# Patient Record
Sex: FEMALE | Race: OTHER | HISPANIC OR LATINO | URBAN - METROPOLITAN AREA
[De-identification: names, ages, dates, MRNs, and addresses within clinical notes are randomized per-mention and may not be internally consistent; named-entity substitution may affect disease eponyms.]

---

## 2017-07-17 ENCOUNTER — INPATIENT (INPATIENT)
Facility: HOSPITAL | Age: 52
LOS: 3 days | Discharge: HOME | DRG: 287 | End: 2017-07-21
Attending: INTERNAL MEDICINE | Admitting: INTERNAL MEDICINE
Payer: COMMERCIAL

## 2017-07-17 VITALS
SYSTOLIC BLOOD PRESSURE: 103 MMHG | RESPIRATION RATE: 24 BRPM | OXYGEN SATURATION: 97 % | HEART RATE: 97 BPM | DIASTOLIC BLOOD PRESSURE: 64 MMHG

## 2017-07-17 DIAGNOSIS — K46.9 UNSPECIFIED ABDOMINAL HERNIA WITHOUT OBSTRUCTION OR GANGRENE: Chronic | ICD-10-CM

## 2017-07-17 DIAGNOSIS — Z98.89 OTHER SPECIFIED POSTPROCEDURAL STATES: Chronic | ICD-10-CM

## 2017-07-17 LAB
ALBUMIN SERPL ELPH-MCNC: 4.4 G/DL — SIGNIFICANT CHANGE UP (ref 3.3–5)
ALP SERPL-CCNC: 63 U/L — SIGNIFICANT CHANGE UP (ref 40–120)
ALT FLD-CCNC: 20 U/L RC — SIGNIFICANT CHANGE UP (ref 10–45)
ANION GAP SERPL CALC-SCNC: 20 MMOL/L — HIGH (ref 5–17)
APTT BLD: 27.4 SEC — LOW (ref 27.5–37.4)
AST SERPL-CCNC: 18 U/L — SIGNIFICANT CHANGE UP (ref 10–40)
BASOPHILS # BLD AUTO: 0.1 K/UL — SIGNIFICANT CHANGE UP (ref 0–0.2)
BASOPHILS NFR BLD AUTO: 1.4 % — SIGNIFICANT CHANGE UP (ref 0–2)
BILIRUB SERPL-MCNC: 0.3 MG/DL — SIGNIFICANT CHANGE UP (ref 0.2–1.2)
BUN SERPL-MCNC: 23 MG/DL — SIGNIFICANT CHANGE UP (ref 7–23)
CALCIUM SERPL-MCNC: 10 MG/DL — SIGNIFICANT CHANGE UP (ref 8.4–10.5)
CHLORIDE SERPL-SCNC: 99 MMOL/L — SIGNIFICANT CHANGE UP (ref 96–108)
CO2 SERPL-SCNC: 22 MMOL/L — SIGNIFICANT CHANGE UP (ref 22–31)
CREAT SERPL-MCNC: 0.69 MG/DL — SIGNIFICANT CHANGE UP (ref 0.5–1.3)
EOSINOPHIL # BLD AUTO: 0.2 K/UL — SIGNIFICANT CHANGE UP (ref 0–0.5)
EOSINOPHIL NFR BLD AUTO: 3.1 % — SIGNIFICANT CHANGE UP (ref 0–6)
GAS PNL BLDV: SIGNIFICANT CHANGE UP
GLUCOSE SERPL-MCNC: 155 MG/DL — HIGH (ref 70–99)
HCT VFR BLD CALC: 38.1 % — SIGNIFICANT CHANGE UP (ref 34.5–45)
HGB BLD-MCNC: 13 G/DL — SIGNIFICANT CHANGE UP (ref 11.5–15.5)
INR BLD: 1 RATIO — SIGNIFICANT CHANGE UP (ref 0.88–1.16)
LYMPHOCYTES # BLD AUTO: 2 K/UL — SIGNIFICANT CHANGE UP (ref 1–3.3)
LYMPHOCYTES # BLD AUTO: 28.3 % — SIGNIFICANT CHANGE UP (ref 13–44)
MCHC RBC-ENTMCNC: 29.9 PG — SIGNIFICANT CHANGE UP (ref 27–34)
MCHC RBC-ENTMCNC: 34.2 GM/DL — SIGNIFICANT CHANGE UP (ref 32–36)
MCV RBC AUTO: 87.3 FL — SIGNIFICANT CHANGE UP (ref 80–100)
MONOCYTES # BLD AUTO: 0.6 K/UL — SIGNIFICANT CHANGE UP (ref 0–0.9)
MONOCYTES NFR BLD AUTO: 8.6 % — SIGNIFICANT CHANGE UP (ref 2–14)
NEUTROPHILS # BLD AUTO: 4.1 K/UL — SIGNIFICANT CHANGE UP (ref 1.8–7.4)
NEUTROPHILS NFR BLD AUTO: 58.6 % — SIGNIFICANT CHANGE UP (ref 43–77)
NT-PROBNP SERPL-SCNC: 34 PG/ML — SIGNIFICANT CHANGE UP (ref 0–300)
PLATELET # BLD AUTO: 301 K/UL — SIGNIFICANT CHANGE UP (ref 150–400)
POTASSIUM SERPL-MCNC: 4.2 MMOL/L — SIGNIFICANT CHANGE UP (ref 3.5–5.3)
POTASSIUM SERPL-SCNC: 4.2 MMOL/L — SIGNIFICANT CHANGE UP (ref 3.5–5.3)
PROT SERPL-MCNC: 7.8 G/DL — SIGNIFICANT CHANGE UP (ref 6–8.3)
PROTHROM AB SERPL-ACNC: 10.8 SEC — SIGNIFICANT CHANGE UP (ref 9.8–12.7)
RBC # BLD: 4.36 M/UL — SIGNIFICANT CHANGE UP (ref 3.8–5.2)
RBC # FLD: 13.6 % — SIGNIFICANT CHANGE UP (ref 10.3–14.5)
SODIUM SERPL-SCNC: 141 MMOL/L — SIGNIFICANT CHANGE UP (ref 135–145)
TROPONIN T SERPL-MCNC: <0.01 NG/ML — SIGNIFICANT CHANGE UP (ref 0–0.06)
TROPONIN T SERPL-MCNC: <0.01 NG/ML — SIGNIFICANT CHANGE UP (ref 0–0.06)
WBC # BLD: 6.9 K/UL — SIGNIFICANT CHANGE UP (ref 3.8–10.5)
WBC # FLD AUTO: 6.9 K/UL — SIGNIFICANT CHANGE UP (ref 3.8–10.5)

## 2017-07-17 PROCEDURE — 99220: CPT | Mod: 25

## 2017-07-17 PROCEDURE — 93010 ELECTROCARDIOGRAM REPORT: CPT

## 2017-07-17 PROCEDURE — 93010 ELECTROCARDIOGRAM REPORT: CPT | Mod: 77

## 2017-07-17 RX ORDER — SODIUM CHLORIDE 9 MG/ML
3 INJECTION INTRAMUSCULAR; INTRAVENOUS; SUBCUTANEOUS ONCE
Qty: 0 | Refills: 0 | Status: COMPLETED | OUTPATIENT
Start: 2017-07-17 | End: 2017-07-17

## 2017-07-17 RX ORDER — FUROSEMIDE 40 MG
40 TABLET ORAL DAILY
Qty: 0 | Refills: 0 | Status: DISCONTINUED | OUTPATIENT
Start: 2017-07-17 | End: 2017-07-18

## 2017-07-17 RX ORDER — SODIUM CHLORIDE 9 MG/ML
3 INJECTION INTRAMUSCULAR; INTRAVENOUS; SUBCUTANEOUS EVERY 12 HOURS
Qty: 0 | Refills: 0 | Status: DISCONTINUED | OUTPATIENT
Start: 2017-07-17 | End: 2017-07-21

## 2017-07-17 RX ORDER — SODIUM CHLORIDE 9 MG/ML
1000 INJECTION INTRAMUSCULAR; INTRAVENOUS; SUBCUTANEOUS
Qty: 0 | Refills: 0 | Status: DISCONTINUED | OUTPATIENT
Start: 2017-07-17 | End: 2017-07-18

## 2017-07-17 RX ORDER — CARVEDILOL PHOSPHATE 80 MG/1
12.5 CAPSULE, EXTENDED RELEASE ORAL EVERY 12 HOURS
Qty: 0 | Refills: 0 | Status: DISCONTINUED | OUTPATIENT
Start: 2017-07-17 | End: 2017-07-18

## 2017-07-17 RX ORDER — ASPIRIN/CALCIUM CARB/MAGNESIUM 324 MG
324 TABLET ORAL ONCE
Qty: 0 | Refills: 0 | Status: COMPLETED | OUTPATIENT
Start: 2017-07-17 | End: 2017-07-17

## 2017-07-17 RX ORDER — VALSARTAN 80 MG/1
320 TABLET ORAL DAILY
Qty: 0 | Refills: 0 | Status: DISCONTINUED | OUTPATIENT
Start: 2017-07-17 | End: 2017-07-20

## 2017-07-17 RX ORDER — SODIUM CHLORIDE 9 MG/ML
500 INJECTION INTRAMUSCULAR; INTRAVENOUS; SUBCUTANEOUS ONCE
Qty: 0 | Refills: 0 | Status: COMPLETED | OUTPATIENT
Start: 2017-07-17 | End: 2017-07-17

## 2017-07-17 RX ADMIN — SODIUM CHLORIDE 3 MILLILITER(S): 9 INJECTION INTRAMUSCULAR; INTRAVENOUS; SUBCUTANEOUS at 19:51

## 2017-07-17 RX ADMIN — Medication 75 MILLIGRAM(S): at 22:34

## 2017-07-17 RX ADMIN — Medication 324 MILLIGRAM(S): at 15:20

## 2017-07-17 RX ADMIN — SODIUM CHLORIDE 3 MILLILITER(S): 9 INJECTION INTRAMUSCULAR; INTRAVENOUS; SUBCUTANEOUS at 14:52

## 2017-07-17 RX ADMIN — SODIUM CHLORIDE 100 MILLILITER(S): 9 INJECTION INTRAMUSCULAR; INTRAVENOUS; SUBCUTANEOUS at 22:34

## 2017-07-17 RX ADMIN — SODIUM CHLORIDE 500 MILLILITER(S): 9 INJECTION INTRAMUSCULAR; INTRAVENOUS; SUBCUTANEOUS at 21:35

## 2017-07-17 NOTE — ED CDU PROVIDER NOTE - DETAILS
CROW, Chest Pain  -frequent reevaluations  -tele monitoring  -repeat troponin w/ EKG  -Stress test  -Case d/w Dr. Whiting

## 2017-07-17 NOTE — ED PROVIDER NOTE - OBJECTIVE STATEMENT
53yo F pmhx of HF ? EF on ASA, hernia repair, c/s pw with progressive dyspnea on exertion x weeks and 1 episodes of left sided chest pain that lasted minutes. Pain came on at rest. no radiation. did not take meds for pain. No nausea, vomiting, diaphoresis, no focal numbness or weakness. Pt endorses recent medication change 3-4 days ago from spironolactone to diovan. Endorses bl le edema.    No fever/chills, no change in vision, no throat pain, no jvd, +sob, +leg swelling no calf pain, no orthopnea, no pnd, +decrease exersized tolerance, no recent travel, no cancer hx, no prior hx of dvt/blood clot,  no abdominal pain, no nausea/vomiting,  no dysuria, no joint pain, no rashes, no focal numbness or weakness, no known mental health issues

## 2017-07-17 NOTE — ED CDU PROVIDER NOTE - OBJECTIVE STATEMENT
51yo F pmhx of HF ? EF on ASA, hernia repair, c/s pw with progressive dyspnea on exertion x weeks and 1 episodes of left sided chest pain that lasted minutes. Pain came on at rest. no radiation. did not take meds for pain. No nausea, vomiting, diaphoresis, no focal numbness or weakness. Pt endorses recent medication change 3-4 days ago from spironolactone to diovan. Endorses bl le edema.    No fever/chills, no change in vision, no throat pain, no jvd, +sob, +leg swelling no calf pain, no orthopnea, no pnd, +decrease exersized tolerance, no recent travel, no cancer hx, no prior hx of dvt/blood clot,  no abdominal pain, no nausea/vomiting,  no dysuria, no joint pain, no rashes, no focal numbness or weakness, no known mental health issues

## 2017-07-17 NOTE — ED PROVIDER NOTE - MEDICAL DECISION MAKING DETAILS
Att yo female recent dx of chf on lasix day 2 presents with progressive fleming x 1 week; had bilateral le edema which is improving; chest pain x 1 episode none currently; no fevers; no abdominal pain; no cough; on exam nad, lungs cta, nontender abdomen; 1+ bilateral le edema; r/o includes cardiac, chf, heme, metabolic; Plan: ekg, ce, cbc, cmp, bnp, cxr, admission

## 2017-07-17 NOTE — ED CDU PROVIDER NOTE - PROGRESS NOTE DETAILS
Attempted call to patients primary care physician Dr. Larry Robertson, no one available covering at this time. Juan Sotelo PA-C. CDU NOTE GRUPO Agustin: pt resting comfortably, feels well without complaint. NAD VSS. no events on tele. CDU NOTE GRUPO Agustin: pt asleep. NAD VSS. no events on tele. CDU NOTE PA Nikole: pt resting c/o chest pressure and sob. NAD VSS. no events on tele. pt had just lowered head rest. EKG- no ischemic findings. Attending MD Wilson: 52F with PMH including DM, HF, HTN presented with chest pain and was pending stress.  Unable to tolerate stress, claustrophobic for nuclear portion, suboptimal candidate for stress echo.  Given persistent symptoms, will admit. On exam, head NCAT, no tenderness to palpation or stepoffs along length of spine, lungs CTAB with good inspiratory effort, +S1S2, no m/r/g, abdomen soft with +BS, NT,  moving all extremities, ambulating. A/P: 52F with chest pain with multiple medical comorbidities, will admit

## 2017-07-17 NOTE — ED ADULT NURSE NOTE - OBJECTIVE STATEMENT
51 yo F wheeled in from triage co SOB since this morning. Recently diagnosed with CHF on Saturday started on 40 mg Lasix 1x daily. Hx of diabetes, hypertension, 2x CVA's in 2013, hernia repair and small bowel obstruction repair in 2003. Upon assessment 96-97% on room air, Lung sounds clear and diminished bilaterally. Pt states she feels better lying on stretcher opposed to walking. Pt states edema is less severe than it was on Saturday. Patient presents with slight edema upon assessment. Vital signs are stable, EKG performed with large bore IV in place. Abdomen distended per patient this is baseline. Pt sleeps sitting up at night. hx of smoking but quit 4 years ago.

## 2017-07-17 NOTE — ED CDU PROVIDER NOTE - PMH
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

## 2017-07-17 NOTE — ED CDU PROVIDER NOTE - ATTENDING CONTRIBUTION TO CARE
Att yo female recent dx of chf on lasix day 2 presents with progressive fleming x 1 week; had bilateral le edema which is improving; chest pain x 1 episode none currently; no fevers; no abdominal pain; no cough; on exam nad, lungs cta, nontender abdomen; 1+ bilateral le edema; initial troponin negative; Plan: cdu for serial enzymes, stress, tele

## 2017-07-17 NOTE — ED CDU PROVIDER NOTE - MEDICAL DECISION MAKING DETAILS
Att yo female recent dx of chf on lasix day 2 presents with progressive fleming x 1 week; had bilateral le edema which is improving; chest pain x 1 episode none currently; no fevers; no abdominal pain; no cough; on exam nad, lungs cta, nontender abdomen; 1+ bilateral le edema; r/o includes cardiac, chf, heme, metabolic; initial trop negative; plan: tele, repeat trop, stress

## 2017-07-17 NOTE — ED CDU PROVIDER NOTE - PLAN OF CARE
1. Stay hydrated.  2. Continue Current Home Medications  3. Follow up with your PCP in 1-2 days (Bring printed results to your doctor visit).  4. Return if symptoms, worsen, fever, weakness, chest pain, difficulty breathing, dizziness and all other concerns. 1. Stay hydrated.  2. Continue Current Home Medications  3. Follow up with your PCP Dr. Larry Robertson in 1-2 days (Bring printed results to your doctor visit).  4. Return if symptoms, worsen, fever, weakness, chest pain, difficulty breathing, dizziness and all other concerns.

## 2017-07-18 DIAGNOSIS — E11.9 TYPE 2 DIABETES MELLITUS WITHOUT COMPLICATIONS: ICD-10-CM

## 2017-07-18 DIAGNOSIS — I10 ESSENTIAL (PRIMARY) HYPERTENSION: ICD-10-CM

## 2017-07-18 DIAGNOSIS — E11.40 TYPE 2 DIABETES MELLITUS WITH DIABETIC NEUROPATHY, UNSPECIFIED: ICD-10-CM

## 2017-07-18 DIAGNOSIS — R07.9 CHEST PAIN, UNSPECIFIED: ICD-10-CM

## 2017-07-18 DIAGNOSIS — E05.90 THYROTOXICOSIS, UNSPECIFIED WITHOUT THYROTOXIC CRISIS OR STORM: ICD-10-CM

## 2017-07-18 DIAGNOSIS — R06.09 OTHER FORMS OF DYSPNEA: ICD-10-CM

## 2017-07-18 LAB
CHOLEST SERPL-MCNC: 166 MG/DL — SIGNIFICANT CHANGE UP (ref 10–199)
GAS PNL BLDV: SIGNIFICANT CHANGE UP
HBA1C BLD-MCNC: 8.2 % — HIGH (ref 4–5.6)
HDLC SERPL-MCNC: 31 MG/DL — LOW (ref 40–125)
LIPID PNL WITH DIRECT LDL SERPL: SIGNIFICANT CHANGE UP
TOTAL CHOLESTEROL/HDL RATIO MEASUREMENT: 5.4 RATIO — SIGNIFICANT CHANGE UP (ref 3.3–7.1)
TRIGL SERPL-MCNC: 495 MG/DL — HIGH (ref 10–149)

## 2017-07-18 PROCEDURE — 99217: CPT

## 2017-07-18 RX ORDER — ENOXAPARIN SODIUM 100 MG/ML
40 INJECTION SUBCUTANEOUS DAILY
Qty: 0 | Refills: 0 | Status: DISCONTINUED | OUTPATIENT
Start: 2017-07-18 | End: 2017-07-21

## 2017-07-18 RX ORDER — INSULIN LISPRO 100/ML
7 VIAL (ML) SUBCUTANEOUS
Qty: 0 | Refills: 0 | Status: DISCONTINUED | OUTPATIENT
Start: 2017-07-18 | End: 2017-07-21

## 2017-07-18 RX ORDER — INSULIN GLARGINE 100 [IU]/ML
12 INJECTION, SOLUTION SUBCUTANEOUS AT BEDTIME
Qty: 0 | Refills: 0 | Status: DISCONTINUED | OUTPATIENT
Start: 2017-07-18 | End: 2017-07-21

## 2017-07-18 RX ORDER — DEXTROSE 50 % IN WATER 50 %
1 SYRINGE (ML) INTRAVENOUS ONCE
Qty: 0 | Refills: 0 | Status: DISCONTINUED | OUTPATIENT
Start: 2017-07-18 | End: 2017-07-21

## 2017-07-18 RX ORDER — ASPIRIN/CALCIUM CARB/MAGNESIUM 324 MG
81 TABLET ORAL DAILY
Qty: 0 | Refills: 0 | Status: DISCONTINUED | OUTPATIENT
Start: 2017-07-18 | End: 2017-07-21

## 2017-07-18 RX ORDER — CALCIUM CARBONATE 500(1250)
1 TABLET ORAL ONCE
Qty: 0 | Refills: 0 | Status: COMPLETED | OUTPATIENT
Start: 2017-07-18 | End: 2017-07-18

## 2017-07-18 RX ORDER — INSULIN LISPRO 100/ML
VIAL (ML) SUBCUTANEOUS AT BEDTIME
Qty: 0 | Refills: 0 | Status: DISCONTINUED | OUTPATIENT
Start: 2017-07-18 | End: 2017-07-21

## 2017-07-18 RX ORDER — SODIUM CHLORIDE 9 MG/ML
1000 INJECTION, SOLUTION INTRAVENOUS
Qty: 0 | Refills: 0 | Status: DISCONTINUED | OUTPATIENT
Start: 2017-07-18 | End: 2017-07-21

## 2017-07-18 RX ORDER — PANTOPRAZOLE SODIUM 20 MG/1
40 TABLET, DELAYED RELEASE ORAL
Qty: 0 | Refills: 0 | Status: DISCONTINUED | OUTPATIENT
Start: 2017-07-18 | End: 2017-07-21

## 2017-07-18 RX ORDER — ASPIRIN/CALCIUM CARB/MAGNESIUM 324 MG
81 TABLET ORAL DAILY
Qty: 0 | Refills: 0 | Status: DISCONTINUED | OUTPATIENT
Start: 2017-07-18 | End: 2017-07-18

## 2017-07-18 RX ORDER — DOCUSATE SODIUM 100 MG
100 CAPSULE ORAL DAILY
Qty: 0 | Refills: 0 | Status: DISCONTINUED | OUTPATIENT
Start: 2017-07-18 | End: 2017-07-21

## 2017-07-18 RX ORDER — CARVEDILOL PHOSPHATE 80 MG/1
12.5 CAPSULE, EXTENDED RELEASE ORAL EVERY 12 HOURS
Qty: 0 | Refills: 0 | Status: DISCONTINUED | OUTPATIENT
Start: 2017-07-18 | End: 2017-07-21

## 2017-07-18 RX ORDER — INSULIN LISPRO 100/ML
VIAL (ML) SUBCUTANEOUS
Qty: 0 | Refills: 0 | Status: DISCONTINUED | OUTPATIENT
Start: 2017-07-18 | End: 2017-07-21

## 2017-07-18 RX ORDER — ESCITALOPRAM OXALATE 10 MG/1
20 TABLET, FILM COATED ORAL DAILY
Qty: 0 | Refills: 0 | Status: DISCONTINUED | OUTPATIENT
Start: 2017-07-18 | End: 2017-07-21

## 2017-07-18 RX ORDER — DEXTROSE 50 % IN WATER 50 %
25 SYRINGE (ML) INTRAVENOUS ONCE
Qty: 0 | Refills: 0 | Status: DISCONTINUED | OUTPATIENT
Start: 2017-07-18 | End: 2017-07-21

## 2017-07-18 RX ORDER — GLUCAGON INJECTION, SOLUTION 0.5 MG/.1ML
1 INJECTION, SOLUTION SUBCUTANEOUS ONCE
Qty: 0 | Refills: 0 | Status: DISCONTINUED | OUTPATIENT
Start: 2017-07-18 | End: 2017-07-21

## 2017-07-18 RX ORDER — FUROSEMIDE 40 MG
40 TABLET ORAL DAILY
Qty: 0 | Refills: 0 | Status: DISCONTINUED | OUTPATIENT
Start: 2017-07-18 | End: 2017-07-20

## 2017-07-18 RX ORDER — DEXTROSE 50 % IN WATER 50 %
12.5 SYRINGE (ML) INTRAVENOUS ONCE
Qty: 0 | Refills: 0 | Status: DISCONTINUED | OUTPATIENT
Start: 2017-07-18 | End: 2017-07-21

## 2017-07-18 RX ORDER — ATORVASTATIN CALCIUM 80 MG/1
40 TABLET, FILM COATED ORAL AT BEDTIME
Qty: 0 | Refills: 0 | Status: DISCONTINUED | OUTPATIENT
Start: 2017-07-18 | End: 2017-07-21

## 2017-07-18 RX ADMIN — SODIUM CHLORIDE 3 MILLILITER(S): 9 INJECTION INTRAMUSCULAR; INTRAVENOUS; SUBCUTANEOUS at 05:59

## 2017-07-18 RX ADMIN — INSULIN GLARGINE 12 UNIT(S): 100 INJECTION, SOLUTION SUBCUTANEOUS at 22:52

## 2017-07-18 RX ADMIN — ENOXAPARIN SODIUM 40 MILLIGRAM(S): 100 INJECTION SUBCUTANEOUS at 17:33

## 2017-07-18 RX ADMIN — Medication 81 MILLIGRAM(S): at 17:33

## 2017-07-18 RX ADMIN — Medication 100 MILLIGRAM(S): at 17:33

## 2017-07-18 RX ADMIN — Medication 2: at 21:11

## 2017-07-18 RX ADMIN — SODIUM CHLORIDE 3 MILLILITER(S): 9 INJECTION INTRAMUSCULAR; INTRAVENOUS; SUBCUTANEOUS at 17:22

## 2017-07-18 RX ADMIN — Medication 40 MILLIGRAM(S): at 17:33

## 2017-07-18 RX ADMIN — Medication 1 TABLET(S): at 22:52

## 2017-07-18 RX ADMIN — PANTOPRAZOLE SODIUM 40 MILLIGRAM(S): 20 TABLET, DELAYED RELEASE ORAL at 17:33

## 2017-07-18 RX ADMIN — CARVEDILOL PHOSPHATE 12.5 MILLIGRAM(S): 80 CAPSULE, EXTENDED RELEASE ORAL at 17:33

## 2017-07-18 RX ADMIN — ESCITALOPRAM OXALATE 20 MILLIGRAM(S): 10 TABLET, FILM COATED ORAL at 18:59

## 2017-07-18 RX ADMIN — ATORVASTATIN CALCIUM 40 MILLIGRAM(S): 80 TABLET, FILM COATED ORAL at 21:11

## 2017-07-18 RX ADMIN — VALSARTAN 320 MILLIGRAM(S): 80 TABLET ORAL at 11:58

## 2017-07-18 RX ADMIN — Medication 2: at 17:32

## 2017-07-18 RX ADMIN — Medication 75 MILLIGRAM(S): at 13:15

## 2017-07-18 RX ADMIN — Medication 150 MILLIGRAM(S): at 21:11

## 2017-07-18 NOTE — H&P ADULT - ASSESSMENT
53yo F pmhx of DM,HTN ,HLD ,Hyperthyroidism  presented  with progressive dyspnea on exertion x 2 weeks and 1 episodes of left sided chest pain that lasted minutes. Pain came on at rest. no radiation. did not take meds for pain. No nausea, vomiting, diaphoresis, no focal numbness or weakness. Pt endorses recent medication change 3-4 days ago from spironolactone to diovan. Endorses bl le edema. She went to do stress test today but couldnt tolerate

## 2017-07-18 NOTE — H&P ADULT - RS GEN PE MLT RESP DETAILS PC
normal/no intercostal retractions/breath sounds equal/no rhonchi/airway patent/no chest wall tenderness/clear to auscultation bilaterally/respirations non-labored/good air movement/no rales

## 2017-07-18 NOTE — CONSULT NOTE ADULT - ASSESSMENT
Assessment  DMT2: 52y Female with DM T2 with hyperglycemia with neuropathy nephropathy blood sugars running high, non compliant with low carb diet.  HTN: uncontrolled on meds.  HLD:  on statin, tolerating.  Hyperthyroidism: on Tapazole 5mg po rachel, asymptomatic

## 2017-07-18 NOTE — CONSULT NOTE ADULT - SUBJECTIVE AND OBJECTIVE BOX
HPI:  53yo F pmhx of DM,HTN ,HLD ,Hyperthyroidism  presented  with progressive dyspnea on exertion x 2 weeks and 1 episodes of left sided chest pain that lasted minutes. Pain came on at rest. no radiation. did not take meds for pain. No nausea, vomiting, diaphoresis, no focal numbness or weakness. Pt endorses recent medication change 3-4 days ago from spironolactone to diovan. Endorses bl le edema. She went to do stress test today but couldnt tolerate (2017 16:20)    Patient has history of diabetes, on oral meds and on insulin at home, no recent hypoglycemic episodes, no polyuria polydipsia. Patient follows up with PCP for diabetes management. Patient has history of hyperthyroidism on Tapazole, no palpitations, no chest pain.    PAST MEDICAL & SURGICAL HISTORY:  Hyperthyroidism  Neuropathy due to type 2 diabetes mellitus  Stroke: &quot;visual agnosia, b/L cerebellar&quot; 2013  Hypertension  Diabetes  H/O: :   H/O fasciotomy: IV infiltrated on L hand - Median and ulnar nerve damage  Abdominal hernia: ventral hernias, multiple, with mesh, lysis of adhesions in       FAMILY HISTORY:  No pertinent family history in first degree relatives      Social History:    Outpatient Medications:    MEDICATIONS  (STANDING):  sodium chloride 0.9% lock flush 3 milliLiter(s) IV Push every 12 hours  methimazole 5 milliGRAM(s) Oral daily  valsartan 320 milliGRAM(s) Oral daily  insulin lispro (HumaLOG) corrective regimen sliding scale   SubCutaneous three times a day before meals  insulin lispro (HumaLOG) corrective regimen sliding scale   SubCutaneous at bedtime  dextrose 5%. 1000 milliLiter(s) (50 mL/Hr) IV Continuous <Continuous>  dextrose 50% Injectable 12.5 Gram(s) IV Push once  dextrose 50% Injectable 25 Gram(s) IV Push once  dextrose 50% Injectable 25 Gram(s) IV Push once  enoxaparin Injectable 40 milliGRAM(s) SubCutaneous daily  aspirin enteric coated 81 milliGRAM(s) Oral daily  atorvastatin 40 milliGRAM(s) Oral at bedtime  pregabalin 150 milliGRAM(s) Oral two times a day  escitalopram 20 milliGRAM(s) Oral daily  carvedilol 12.5 milliGRAM(s) Oral every 12 hours  furosemide    Tablet 40 milliGRAM(s) Oral daily  docusate sodium 100 milliGRAM(s) Oral daily  pantoprazole    Tablet 40 milliGRAM(s) Oral before breakfast    MEDICATIONS  (PRN):  dextrose Gel 1 Dose(s) Oral once PRN Blood Glucose LESS THAN 70 milliGRAM(s)/deciliter  glucagon  Injectable 1 milliGRAM(s) IntraMuscular once PRN Glucose LESS THAN 70 milligrams/deciliter      Allergies    Plavix (Other)    Intolerances      Review of Systems:  Constitutional: No fever, no chills  Eyes: No blurry vision  Neuro: No tremors  HEENT: No pain, no neck swelling  Cardiovascular: No chest pain, no palpitations  Respiratory: Has SOB, no cough  GI: No nausea, vomiting, abdominal pain  : No dysuria  Skin: no rash  MSK: Has leg swelling, no foot ulcers.  Psych: no depression  Endocrine: no polyuria, polydipsia    ALL OTHER SYSTEMS REVIEWED AND NEGATIVE    UNABLE TO OBTAIN    PHYSICAL EXAM:  VITALS: T(C): 37.2 (17 @ 20:49)  T(F): 99 (17 @ 20:49), Max: 99.5 (17 @ 14:22)  HR: 102 (17 @ 20:49) (85 - 102)  BP: 113/73 (17 @ 20:49) (95/57 - 132/69)  RR:  (17 - 20)  SpO2:  (95% - 98%)  Wt(kg): --  GENERAL: NAD, well-groomed, well-developed  EYES: No proptosis, no lid lag  HEENT:  Atraumatic, Normocephalic  THYROID: Normal size, no palpable nodules  RESPIRATORY: Clear to auscultation bilaterally; No rales, rhonchi, wheezing  CARDIOVASCULAR: Si S2, No murmurs;  GI: Soft, non distended, normal bowel sounds  SKIN: Dry, intact, No rashes or lesions  MUSCULOSKELETAL: Has BL lower extremity edema.  NEURO:  no tremor, sensation decreased in feet BL,    CAPILLARY BLOOD GLUCOSE  312 ( @ 20:49)  191 ( @ 16:36)                            13.0   6.9   )-----------( 301      ( 2017 14:55 )             38.1           141  |  99  |  23  ----------------------------<  155<H>  4.2   |  22  |  0.69    EGFR if : 114  EGFR if non : 99    Ca    10.0          TPro  7.8  /  Alb  4.4  /  TBili  0.3  /  DBili  x   /  AST  18  /  ALT  20  /  AlkPhos  63        Thyroid Function Tests:      Hemoglobin A1C, Whole Blood: 8.2 % <H> [4.0 - 5.6] (17 @ 07:38)       Chol 166 LDL -- HDL 31<L> Trig 495<H>    Radiology:

## 2017-07-18 NOTE — CONSULT NOTE ADULT - SUBJECTIVE AND OBJECTIVE BOX
HISTORY OF PRESENT ILLNESS: HPI:    51 yo F DM,HTN, Chol, hyperthyroidism, CVA, Multiple sclerosis admitted with 2 weeksd of SOB, CROW and atypical CP.  The pt reports dull sub sternal CP, no clear alleviating or aggravating factors, (+) SOB, CROW.  No LOC, NO PND NO orthopnea mild lower extremity edema.  She reports she had a plain treadmill stress test and echo approximately 2 years ago with Dr. Larry Kapoor that was within normal limits.  Denies Known CAD.  The pain is not pleuritic and she denies recent long tips or prolonged periods of immobility.    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 1000 milliLiter(s) (100 mL/Hr) IV Continuous <Continuous>  pregabalin 75 milliGRAM(s) Oral daily  insulin lispro (HumaLOG) corrective regimen sliding scale   SubCutaneous three times a day before meals  insulin lispro (HumaLOG) corrective regimen sliding scale   SubCutaneous at bedtime  dextrose 5%. 1000 milliLiter(s) (50 mL/Hr) IV Continuous <Continuous>  dextrose 50% Injectable 12.5 Gram(s) IV Push once  dextrose 50% Injectable 25 Gram(s) IV Push once  dextrose 50% Injectable 25 Gram(s) IV Push once  enoxaparin Injectable 40 milliGRAM(s) SubCutaneous daily      Allergies    Plavix (Other)    Intolerances        FAMILY HISTORY:  No pertinent family history in first degree relatives    Non-contributary for premature coronary disease or sudden cardiac death    SOCIAL HISTORY:    [X] Non-smoker  [ ] Smoker  [ ] Alcohol      REVIEW OF SYSTEMS:  [X ]chest pain  [ X ]shortness of breath  [  ]palpitations  [  ]syncope  [ ]near syncope [ ]upper extremity weakness   [ ] lower extremity weakness  [  ]diplopia  [  ]altered mental status   [  ]fevers  [ ]chills [ ]nausea  [ ]vomitting  [  ]dysphagia    [ ]abdominal pain  [ ]melena  [ ]BRBPR    [  ]epistaxis  [  ]rash    [X ]lower extremity edema        [x ] All others negative	  [ ] Unable to obtain    PHYSICAL EXAM:  T(C): 37.5 (17 @ 14:22), Max: 37.5 (17 @ 14:22)  HR: 95 (17 @ 14:22) (85 - 96)  BP: 104/69 (17 @ 14:22) (88/52 - 132/69)  RR: 17 (17 @ 14:22) (17 - 22)  SpO2: 98% (17 @ 14:22) (95% - 99%)  Wt(kg): --  I&O's Summary        HEENT:   Normal oral mucosa, PERRL, EOMI	  Lymphatic: No obvious lymphadenopathy , (+) trace edema  Cardiovascular: Normal S1 S2, No JVD,  1/6 MAGNUS murmur , Peripheral pulses palpable 2+ bilaterally  Respiratory: Lungs clear to auscultation, normal effort 	  Gastrointestinal:  Soft, Non-tender, + BS	  Skin: No rashes, No cyanosis, warm to touch  Musculoskeletal: Normal range of motion, normal strength  Psychiatry:  Appropriate Mood & affect     TELEMETRY: 	  Sinus   ECG:  Sinus 95 BPM, nonspecific ST/T wave abnormality	  RADIOLOGY:       CXR: No infiltrates       	  	  LABS:	 	    CARDIAC MARKERS:  CARDIAC MARKERS ( 2017 21:37 )  x     / <0.01 ng/mL / x     / x     / x      CARDIAC MARKERS ( 2017 14:55 )  x     / <0.01 ng/mL / x     / x     / x                                  13.0   6.9   )-----------( 301      ( 2017 14:55 )             38.1     Hb Trend:         141  |  99  |  23  ----------------------------<  155<H>  4.2   |  22  |  0.69    Ca    10.0      2017 14:55    TPro  7.8  /  Alb  4.4  /  TBili  0.3  /  DBili  x   /  AST  18  /  ALT  20  /  AlkPhos  63      Creatinine Trend: 0.69<--      Lipid Profile:   HgA1c: Hemoglobin A1C, Whole Blood: 8.2 % ( @ 07:38)        ASSESSMENT/PLAN: 	52y Female DM,HTN, Chol, hyperthyroidism, CVA, Multiple sclerosis admitted with 2 weeks of SOB, CROW and atypical CP r/o for MI.    - echo  - given continued episodes of CP and her inability to tolerate a nuclear stress today will plan for cath to definitively exclude obstructive CAD.  - Plan discussed with patient in detail    I once again thank you for allowing me to participate in the care of your patient.  If you have any questions or concerns please do not hesitate to contact me.    Herbert Cunningham MD, FACC  [facility], [facility]   [place] [address]  PHONE:  [phone]  BEEPER : [phone] HISTORY OF PRESENT ILLNESS: HPI:    53 yo F DM,HTN, Chol, hyperthyroidism, CVA, Multiple sclerosis admitted with 2 weeksd of SOB, CROW and atypical CP.  The pt reports dull sub sternal CP, no clear alleviating or aggravating factors, (+) SOB, CROW.  No LOC, NO PND NO orthopnea mild lower extremity edema.  She reports she had a plain treadmill stress test and echo approximately 2 years ago with Dr. Larry Kapoor that was within normal limits.  Denies Known CAD.  The pain is not pleuritic and she denies recent long tips or prolonged periods of immobility.    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 1000 milliLiter(s) (100 mL/Hr) IV Continuous <Continuous>  pregabalin 75 milliGRAM(s) Oral daily  insulin lispro (HumaLOG) corrective regimen sliding scale   SubCutaneous three times a day before meals  insulin lispro (HumaLOG) corrective regimen sliding scale   SubCutaneous at bedtime  dextrose 5%. 1000 milliLiter(s) (50 mL/Hr) IV Continuous <Continuous>  dextrose 50% Injectable 12.5 Gram(s) IV Push once  dextrose 50% Injectable 25 Gram(s) IV Push once  dextrose 50% Injectable 25 Gram(s) IV Push once  enoxaparin Injectable 40 milliGRAM(s) SubCutaneous daily      Allergies    Plavix (Other)    Intolerances        FAMILY HISTORY:  No pertinent family history in first degree relatives    Non-contributary for premature coronary disease or sudden cardiac death    SOCIAL HISTORY:    [X] Non-smoker  [ ] Smoker  [ ] Alcohol      REVIEW OF SYSTEMS:  [X ]chest pain  [ X ]shortness of breath  [  ]palpitations  [  ]syncope  [ ]near syncope [ ]upper extremity weakness   [ ] lower extremity weakness  [  ]diplopia  [  ]altered mental status   [  ]fevers  [ ]chills [ ]nausea  [ ]vomitting  [  ]dysphagia    [ ]abdominal pain  [ ]melena  [ ]BRBPR    [  ]epistaxis  [  ]rash    [X ]lower extremity edema        [x ] All others negative	  [ ] Unable to obtain    PHYSICAL EXAM:  T(C): 37.5 (17 @ 14:22), Max: 37.5 (17 @ 14:22)  HR: 95 (17 @ 14:22) (85 - 96)  BP: 104/69 (17 @ 14:22) (88/52 - 132/69)  RR: 17 (17 @ 14:22) (17 - 22)  SpO2: 98% (17 @ 14:22) (95% - 99%)  Wt(kg): --  I&O's Summary        HEENT:   Normal oral mucosa, PERRL, EOMI	  Lymphatic: No obvious lymphadenopathy , (+) trace edema  Cardiovascular: Normal S1 S2, No JVD,  1/6 MAGNUS murmur , Peripheral pulses palpable 2+ bilaterally  Respiratory: Lungs clear to auscultation, normal effort 	  Gastrointestinal:  Soft, Non-tender, + BS	  Skin: No rashes, No cyanosis, warm to touch  Musculoskeletal: Normal range of motion, normal strength  Psychiatry:  Appropriate Mood & affect     TELEMETRY: 	  Sinus   ECG:  Sinus 95 BPM, nonspecific ST/T wave abnormality	  RADIOLOGY:       CXR: No infiltrates       	  	  LABS:	 	    CARDIAC MARKERS:  CARDIAC MARKERS ( 2017 21:37 )  x     / <0.01 ng/mL / x     / x     / x      CARDIAC MARKERS ( 2017 14:55 )  x     / <0.01 ng/mL / x     / x     / x                                  13.0   6.9   )-----------( 301      ( 2017 14:55 )             38.1     Hb Trend:         141  |  99  |  23  ----------------------------<  155<H>  4.2   |  22  |  0.69    Ca    10.0      2017 14:55    TPro  7.8  /  Alb  4.4  /  TBili  0.3  /  DBili  x   /  AST  18  /  ALT  20  /  AlkPhos  63      Creatinine Trend: 0.69<--      Lipid Profile:   HgA1c: Hemoglobin A1C, Whole Blood: 8.2 % ( @ 07:38)        ASSESSMENT/PLAN: 	52y Female DM,HTN, Chol, hyperthyroidism, CVA, Multiple sclerosis admitted with 2 weeks of SOB, CROW and atypical CP r/o for MI.    - echo  - Start ASA 81 mg PO daily and Lipitor 20 mg PO QHS, would like to start a BB if her BP remanin stable  - given continued episodes of CP and her inability to tolerate a nuclear stress today will plan for cath to definitively exclude obstructive CAD.  - Plan discussed with patient in detail    I once again thank you for allowing me to participate in the care of your patient.  If you have any questions or concerns please do not hesitate to contact me.    Herbert Cunningham MD, [facility], [facility]   [place] [address]  PHONE:  [phone]  BEEPER : [phone] HISTORY OF PRESENT ILLNESS: HPI:    51 yo F DM,HTN, Chol, hyperthyroidism, CVA, Multiple sclerosis admitted with 2 weeksd of SOB, CROW and atypical CP.  The pt reports dull sub sternal CP, no clear alleviating or aggravating factors, (+) SOB, CROW.  No LOC, NO PND NO orthopnea mild lower extremity edema.  She reports she had a plain treadmill stress test and echo approximately 2 years ago with Dr. Larry Kapoor that was within normal limits.  Denies Known CAD.  The pain is not pleuritic and she denies recent long tips or prolonged periods of immobility.    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 1000 milliLiter(s) (100 mL/Hr) IV Continuous <Continuous>  pregabalin 75 milliGRAM(s) Oral daily  insulin lispro (HumaLOG) corrective regimen sliding scale   SubCutaneous three times a day before meals  insulin lispro (HumaLOG) corrective regimen sliding scale   SubCutaneous at bedtime  dextrose 5%. 1000 milliLiter(s) (50 mL/Hr) IV Continuous <Continuous>  dextrose 50% Injectable 12.5 Gram(s) IV Push once  dextrose 50% Injectable 25 Gram(s) IV Push once  dextrose 50% Injectable 25 Gram(s) IV Push once  enoxaparin Injectable 40 milliGRAM(s) SubCutaneous daily      Allergies    Plavix (Other)    Intolerances        FAMILY HISTORY:  No pertinent family history in first degree relatives    Non-contributary for premature coronary disease or sudden cardiac death    SOCIAL HISTORY:    [X] Non-smoker  [ ] Smoker  [ ] Alcohol      REVIEW OF SYSTEMS:  [X ]chest pain  [ X ]shortness of breath  [  ]palpitations  [  ]syncope  [ ]near syncope [ ]upper extremity weakness   [ ] lower extremity weakness  [  ]diplopia  [  ]altered mental status   [  ]fevers  [ ]chills [ ]nausea  [ ]vomitting  [  ]dysphagia    [ ]abdominal pain  [ ]melena  [ ]BRBPR    [  ]epistaxis  [  ]rash    [X ]lower extremity edema        [x ] All others negative	  [ ] Unable to obtain    PHYSICAL EXAM:  T(C): 37.5 (17 @ 14:22), Max: 37.5 (17 @ 14:22)  HR: 95 (17 @ 14:22) (85 - 96)  BP: 104/69 (17 @ 14:22) (88/52 - 132/69)  RR: 17 (17 @ 14:22) (17 - 22)  SpO2: 98% (17 @ 14:22) (95% - 99%)  Wt(kg): --  I&O's Summary        HEENT:   Normal oral mucosa, PERRL, EOMI	  Lymphatic: No obvious lymphadenopathy , (+) trace edema  Cardiovascular: Normal S1 S2, No JVD,  1/6 MAGNUS murmur , Peripheral pulses palpable 2+ bilaterally  Respiratory: Lungs clear to auscultation, normal effort 	  Gastrointestinal:  Soft, Non-tender, + BS	  Skin: No rashes, No cyanosis, warm to touch  Musculoskeletal: Normal range of motion, normal strength  Psychiatry:  Appropriate Mood & affect     TELEMETRY: 	  Sinus   ECG:  Sinus 95 BPM, nonspecific ST/T wave abnormality	  RADIOLOGY:       CXR: No infiltrates       	  	  LABS:	 	    CARDIAC MARKERS:  CARDIAC MARKERS ( 2017 21:37 )  x     / <0.01 ng/mL / x     / x     / x      CARDIAC MARKERS ( 2017 14:55 )  x     / <0.01 ng/mL / x     / x     / x                                  13.0   6.9   )-----------( 301      ( 2017 14:55 )             38.1     Hb Trend:         141  |  99  |  23  ----------------------------<  155<H>  4.2   |  22  |  0.69    Ca    10.0      2017 14:55    TPro  7.8  /  Alb  4.4  /  TBili  0.3  /  DBili  x   /  AST  18  /  ALT  20  /  AlkPhos  63      Creatinine Trend: 0.69<--      Lipid Profile:   HgA1c: Hemoglobin A1C, Whole Blood: 8.2 % ( @ 07:38)        ASSESSMENT/PLAN: 	52y Female DM,HTN, Chol, hyperthyroidism, CVA, Multiple sclerosis admitted with 2 weeks of SOB, CROW and atypical CP r/o for MI.    - echo  - Start ASA 81 mg PO daily and Lipitor 40 mg PO QHS, would like to start a BB if her BP remanin stable  - given continued episodes of CP and her inability to tolerate a nuclear stress today will plan for cath to definitively exclude obstructive CAD.  - Plan discussed with patient in detail    I once again thank you for allowing me to participate in the care of your patient.  If you have any questions or concerns please do not hesitate to contact me.    Herbert Cunningham MD, [facility], [facility]   [place] [address]  PHONE:  [phone]  BEEPER : [phone]

## 2017-07-18 NOTE — H&P ADULT - PROBLEM SELECTOR PLAN 1
R/O ACS ...Cardiology consulted...With multiple risk factors and not able to tolerate Stress test is planned for cardiac cath

## 2017-07-18 NOTE — H&P ADULT - PSH
Abdominal hernia  ventral hernias, multiple, with mesh, lysis of adhesions in   H/O fasciotomy  IV infiltrated on L hand - Median and ulnar nerve damage  H/O:   1989

## 2017-07-18 NOTE — H&P ADULT - HISTORY OF PRESENT ILLNESS
51yo F pmhx of DM,HTN ,HLD ,Hyperthyroidism  presented  with progressive dyspnea on exertion x 2 weeks and 1 episodes of left sided chest pain that lasted minutes. Pain came on at rest. no radiation. did not take meds for pain. No nausea, vomiting, diaphoresis, no focal numbness or weakness. Pt endorses recent medication change 3-4 days ago from spironolactone to diovan. Endorses bl le edema. She went to do stress test today but couldnt tolerate

## 2017-07-18 NOTE — ED ADULT NURSE REASSESSMENT NOTE - NS ED NURSE REASSESS COMMENT FT1
Pt BP 99/56. MD Garza aware, assessed patient. Cleared to go to CDU.
report taken from Bri PALM at 7:00
Pt received from ARPITA Gusman. Pt oriented to CDU & plan of care was discussed. No complaints of chest pain, SOB, dizziness or palpitations. Pt states she hasn't had chest pain in a few hours and only experiencing SOB on exertion. Safety & comfort measures maintained. Call bell in reach. Will continue to monitor.

## 2017-07-19 ENCOUNTER — TRANSCRIPTION ENCOUNTER (OUTPATIENT)
Age: 52
End: 2017-07-19

## 2017-07-19 LAB
ANION GAP SERPL CALC-SCNC: 17 MMOL/L — SIGNIFICANT CHANGE UP (ref 5–17)
BUN SERPL-MCNC: 24 MG/DL — HIGH (ref 7–23)
CALCIUM SERPL-MCNC: 9.7 MG/DL — SIGNIFICANT CHANGE UP (ref 8.4–10.5)
CHLORIDE SERPL-SCNC: 97 MMOL/L — SIGNIFICANT CHANGE UP (ref 96–108)
CO2 SERPL-SCNC: 23 MMOL/L — SIGNIFICANT CHANGE UP (ref 22–31)
CREAT SERPL-MCNC: 0.64 MG/DL — SIGNIFICANT CHANGE UP (ref 0.5–1.3)
GLUCOSE SERPL-MCNC: 184 MG/DL — HIGH (ref 70–99)
HCT VFR BLD CALC: 35 % — SIGNIFICANT CHANGE UP (ref 34.5–45)
HGB BLD-MCNC: 11.3 G/DL — LOW (ref 11.5–15.5)
MCHC RBC-ENTMCNC: 28.3 PG — SIGNIFICANT CHANGE UP (ref 27–34)
MCHC RBC-ENTMCNC: 32.3 GM/DL — SIGNIFICANT CHANGE UP (ref 32–36)
MCV RBC AUTO: 87.7 FL — SIGNIFICANT CHANGE UP (ref 80–100)
PLATELET # BLD AUTO: 289 K/UL — SIGNIFICANT CHANGE UP (ref 150–400)
POTASSIUM SERPL-MCNC: 4.2 MMOL/L — SIGNIFICANT CHANGE UP (ref 3.5–5.3)
POTASSIUM SERPL-SCNC: 4.2 MMOL/L — SIGNIFICANT CHANGE UP (ref 3.5–5.3)
RBC # BLD: 3.99 M/UL — SIGNIFICANT CHANGE UP (ref 3.8–5.2)
RBC # FLD: 14.4 % — SIGNIFICANT CHANGE UP (ref 10.3–14.5)
SODIUM SERPL-SCNC: 137 MMOL/L — SIGNIFICANT CHANGE UP (ref 135–145)
T4 FREE SERPL-MCNC: 1.2 NG/DL — SIGNIFICANT CHANGE UP (ref 0.9–1.8)
THYROPEROXIDASE AB SERPL-ACNC: <10 IU/ML — SIGNIFICANT CHANGE UP (ref 0–34)
TSH SERPL-MCNC: 1.64 UIU/ML — SIGNIFICANT CHANGE UP (ref 0.27–4.2)
WBC # BLD: 5.45 K/UL — SIGNIFICANT CHANGE UP (ref 3.8–10.5)
WBC # FLD AUTO: 5.45 K/UL — SIGNIFICANT CHANGE UP (ref 3.8–10.5)

## 2017-07-19 PROCEDURE — 93306 TTE W/DOPPLER COMPLETE: CPT | Mod: 26

## 2017-07-19 PROCEDURE — 93458 L HRT ARTERY/VENTRICLE ANGIO: CPT | Mod: 26,GC

## 2017-07-19 RX ADMIN — Medication 7 UNIT(S): at 08:33

## 2017-07-19 RX ADMIN — Medication 40 MILLIGRAM(S): at 06:13

## 2017-07-19 RX ADMIN — Medication 7 UNIT(S): at 17:42

## 2017-07-19 RX ADMIN — CARVEDILOL PHOSPHATE 12.5 MILLIGRAM(S): 80 CAPSULE, EXTENDED RELEASE ORAL at 17:39

## 2017-07-19 RX ADMIN — VALSARTAN 320 MILLIGRAM(S): 80 TABLET ORAL at 06:13

## 2017-07-19 RX ADMIN — PANTOPRAZOLE SODIUM 40 MILLIGRAM(S): 20 TABLET, DELAYED RELEASE ORAL at 06:13

## 2017-07-19 RX ADMIN — Medication 4: at 11:57

## 2017-07-19 RX ADMIN — SODIUM CHLORIDE 3 MILLILITER(S): 9 INJECTION INTRAMUSCULAR; INTRAVENOUS; SUBCUTANEOUS at 17:39

## 2017-07-19 RX ADMIN — Medication 81 MILLIGRAM(S): at 11:55

## 2017-07-19 RX ADMIN — Medication 150 MILLIGRAM(S): at 17:42

## 2017-07-19 RX ADMIN — ATORVASTATIN CALCIUM 40 MILLIGRAM(S): 80 TABLET, FILM COATED ORAL at 21:32

## 2017-07-19 RX ADMIN — CARVEDILOL PHOSPHATE 12.5 MILLIGRAM(S): 80 CAPSULE, EXTENDED RELEASE ORAL at 06:13

## 2017-07-19 RX ADMIN — Medication 4: at 08:33

## 2017-07-19 RX ADMIN — ESCITALOPRAM OXALATE 20 MILLIGRAM(S): 10 TABLET, FILM COATED ORAL at 11:55

## 2017-07-19 RX ADMIN — SODIUM CHLORIDE 3 MILLILITER(S): 9 INJECTION INTRAMUSCULAR; INTRAVENOUS; SUBCUTANEOUS at 06:09

## 2017-07-19 RX ADMIN — Medication 150 MILLIGRAM(S): at 06:13

## 2017-07-19 RX ADMIN — Medication 4: at 17:42

## 2017-07-19 RX ADMIN — INSULIN GLARGINE 12 UNIT(S): 100 INJECTION, SOLUTION SUBCUTANEOUS at 21:32

## 2017-07-19 NOTE — PROGRESS NOTE ADULT - SUBJECTIVE AND OBJECTIVE BOX
Chief complaint  Patient is a 52y old  Female who presents with a chief complaint of  Review of systems  Patient in bed, comfortable, no fever,  no hypoglycemia.    Labs and Fingesticks    CAPILLARY BLOOD GLUCOSE  239 (19 Jul 2017 11:48)  205 (19 Jul 2017 08:38)  312 (18 Jul 2017 20:49)  191 (18 Jul 2017 16:36)    Anion Gap, Serum: 17 (07-19 @ 07:40)  Anion Gap, Serum: 20 <H> (07-17 @ 14:55)    Hemoglobin A1C, Whole Blood: 8.2 <H> (07-18 @ 07:38)    Calcium, Total Serum: 9.7 (07-19 @ 07:40)  Calcium, Total Serum: 10.0 (07-17 @ 14:55)  Albumin, Serum: 4.4 (07-17 @ 14:55)    Alanine Aminotransferase (ALT/SGPT): 20 (07-17 @ 14:55)  Alkaline Phosphatase, Serum: 63 (07-17 @ 14:55)  Aspartate Aminotransferase (AST/SGOT): 18 (07-17 @ 14:55)        07-19    137  |  97  |  24<H>  ----------------------------<  184<H>  4.2   |  23  |  0.64    Ca    9.7      19 Jul 2017 07:40    TPro  7.8  /  Alb  4.4  /  TBili  0.3  /  DBili  x   /  AST  18  /  ALT  20  /  AlkPhos  63  07-17                        11.3   5.45  )-----------( 289      ( 19 Jul 2017 07:35 )             35.0     Medications  MEDICATIONS  (STANDING):  sodium chloride 0.9% lock flush 3 milliLiter(s) IV Push every 12 hours  methimazole 5 milliGRAM(s) Oral daily  valsartan 320 milliGRAM(s) Oral daily  insulin lispro (HumaLOG) corrective regimen sliding scale   SubCutaneous three times a day before meals  insulin lispro (HumaLOG) corrective regimen sliding scale   SubCutaneous at bedtime  dextrose 5%. 1000 milliLiter(s) (50 mL/Hr) IV Continuous <Continuous>  dextrose 50% Injectable 12.5 Gram(s) IV Push once  dextrose 50% Injectable 25 Gram(s) IV Push once  dextrose 50% Injectable 25 Gram(s) IV Push once  enoxaparin Injectable 40 milliGRAM(s) SubCutaneous daily  aspirin enteric coated 81 milliGRAM(s) Oral daily  atorvastatin 40 milliGRAM(s) Oral at bedtime  pregabalin 150 milliGRAM(s) Oral two times a day  escitalopram 20 milliGRAM(s) Oral daily  carvedilol 12.5 milliGRAM(s) Oral every 12 hours  furosemide    Tablet 40 milliGRAM(s) Oral daily  docusate sodium 100 milliGRAM(s) Oral daily  pantoprazole    Tablet 40 milliGRAM(s) Oral before breakfast  insulin glargine Injectable (LANTUS) 12 Unit(s) SubCutaneous at bedtime  insulin lispro Injectable (HumaLOG) 7 Unit(s) SubCutaneous three times a day before meals      Physical Exam  General: Patient comfortable in bed  Vital Signs Last 12 Hrs  T(F): 98.4 (07-19-17 @ 11:48), Max: 98.7 (07-19-17 @ 06:12)  HR: 92 (07-19-17 @ 11:48) (90 - 92)  BP: 101/66 (07-19-17 @ 11:48) (101/66 - 124/78)  BP(mean): --  RR: 17 (07-19-17 @ 11:48) (17 - 18)  SpO2: 95% (07-19-17 @ 11:48) (95% - 97%)  Neck: No palpable thyroid nodules.  CVS: S1S2, No murmurs  Respiratory: No wheezing, no crepitations  GI: Abdomen soft, bowel sounds positive  Musculoskeletal: Positive edema lower extremities bilaterally  Skin: No skin rashes, no ecchimosis    Diagnostics    Thyroid Stimulating Hormone, Serum: AM Sched. Collection: 19-Jul-2017 06:00 (07-18 @ 22:26)  Free Thyroxine, Serum: AM Sched. Collection: 19-Jul-2017 06:00 (07-18 @ 22:26)  Thyroperoxidase Antibody: AM Sched. Collection: 19-Jul-2017 06:00 (07-18 @ 22:26)  TSH Receptor Antibody: AM Sched. Collection: 19-Jul-2017 06:00 (07-18 @ 22:26)

## 2017-07-19 NOTE — DISCHARGE NOTE ADULT - CARE PROVIDER_API CALL
Larry Bangura (DO), Family Medicine  [address] [place] [address]  Phone: [phone]  Fax: [phone]    Dipti Calderon  Endocrinolgy  Phone: (   )    -  Fax: (   )    -

## 2017-07-19 NOTE — PROGRESS NOTE ADULT - SUBJECTIVE AND OBJECTIVE BOX
Subjective: pt seen and examined, ROS negative.    sodium chloride 0.9% lock flush 3 milliLiter(s) IV Push every 12 hours  methimazole 5 milliGRAM(s) Oral daily  valsartan 320 milliGRAM(s) Oral daily  insulin lispro (HumaLOG) corrective regimen sliding scale   SubCutaneous three times a day before meals  insulin lispro (HumaLOG) corrective regimen sliding scale   SubCutaneous at bedtime  dextrose 5%. 1000 milliLiter(s) IV Continuous <Continuous>  dextrose Gel 1 Dose(s) Oral once PRN  dextrose 50% Injectable 12.5 Gram(s) IV Push once  dextrose 50% Injectable 25 Gram(s) IV Push once  dextrose 50% Injectable 25 Gram(s) IV Push once  glucagon  Injectable 1 milliGRAM(s) IntraMuscular once PRN  enoxaparin Injectable 40 milliGRAM(s) SubCutaneous daily  aspirin enteric coated 81 milliGRAM(s) Oral daily  atorvastatin 40 milliGRAM(s) Oral at bedtime  pregabalin 150 milliGRAM(s) Oral two times a day  escitalopram 20 milliGRAM(s) Oral daily  carvedilol 12.5 milliGRAM(s) Oral every 12 hours  furosemide    Tablet 40 milliGRAM(s) Oral daily  docusate sodium 100 milliGRAM(s) Oral daily  pantoprazole    Tablet 40 milliGRAM(s) Oral before breakfast  insulin glargine Injectable (LANTUS) 12 Unit(s) SubCutaneous at bedtime  insulin lispro Injectable (HumaLOG) 7 Unit(s) SubCutaneous three times a day before meals                            13.0   6.9   )-----------( 301      ( 17 Jul 2017 14:55 )             38.1       Hemoglobin: 13.0 g/dL (07-17 @ 14:55)      07-17    141  |  99  |  23  ----------------------------<  155<H>  4.2   |  22  |  0.69    Ca    10.0      17 Jul 2017 14:55    TPro  7.8  /  Alb  4.4  /  TBili  0.3  /  DBili  x   /  AST  18  /  ALT  20  /  AlkPhos  63  07-17    Creatinine Trend: 0.69<--    COAGS:     CARDIAC MARKERS ( 17 Jul 2017 21:37 )  x     / <0.01 ng/mL / x     / x     / x      CARDIAC MARKERS ( 17 Jul 2017 14:55 )  x     / <0.01 ng/mL / x     / x     / x            T(C): 37.2 (07-18-17 @ 20:49), Max: 37.5 (07-18-17 @ 14:22)  HR: 102 (07-18-17 @ 20:49) (86 - 102)  BP: 113/73 (07-18-17 @ 20:49) (95/57 - 125/76)  RR: 18 (07-18-17 @ 20:49) (17 - 19)  SpO2: 97% (07-18-17 @ 20:49) (95% - 98%)  Wt(kg): --    I&O's Summary      Daily Height in cm: 152.4 (18 Jul 2017 14:22)    Daily       Appearance: Normal	  HEENT:   Normal oral mucosa, PERRL, EOMI	  Lymphatic: No lymphadenopathy , no edema  Cardiovascular: Normal S1 S2, No JVD, No murmurs , Peripheral pulses palpable 2+ bilaterally  Respiratory: Lungs clear to auscultation, normal effort 	  Gastrointestinal:  Soft, Non-tender, + BS	  Skin: No rashes, No ecchymoses, No cyanosis, warm to touch  Musculoskeletal: Normal range of motion, normal strength  Psychiatry:  Mood & affect appropriate    TELEMETRY:  NSR	    ECG:  	  RADIOLOGY:   DIAGNOSTIC TESTING:  [ ] Echocardiogram:  [ ]  Catheterization:  [ ] Stress Test:    OTHER: 	        ASSESSMENT/PLAN: 	52y Female DM,HTN, Chol, hyperthyroidism, CVA, Multiple sclerosis admitted with 2 weeks of SOB, CROW and atypical CP r/o for MI    cardiac marker neg x 2  ASA, statin , coreg,. ARB,   daily lasix   echo pending  pt unable to tolerate NST , plan for Cardiac cath this week to R/O CAD .  tele stable   GI / DVT prophylaxis.  keep K>4, mag >2.0   Endo follow up   D/W Dr Cunningham Subjective: pt seen and examined, No new compaints    sodium chloride 0.9% lock flush 3 milliLiter(s) IV Push every 12 hours  methimazole 5 milliGRAM(s) Oral daily  valsartan 320 milliGRAM(s) Oral daily  insulin lispro (HumaLOG) corrective regimen sliding scale   SubCutaneous three times a day before meals  insulin lispro (HumaLOG) corrective regimen sliding scale   SubCutaneous at bedtime  dextrose 5%. 1000 milliLiter(s) IV Continuous <Continuous>  dextrose Gel 1 Dose(s) Oral once PRN  dextrose 50% Injectable 12.5 Gram(s) IV Push once  dextrose 50% Injectable 25 Gram(s) IV Push once  dextrose 50% Injectable 25 Gram(s) IV Push once  glucagon  Injectable 1 milliGRAM(s) IntraMuscular once PRN  enoxaparin Injectable 40 milliGRAM(s) SubCutaneous daily  aspirin enteric coated 81 milliGRAM(s) Oral daily  atorvastatin 40 milliGRAM(s) Oral at bedtime  pregabalin 150 milliGRAM(s) Oral two times a day  escitalopram 20 milliGRAM(s) Oral daily  carvedilol 12.5 milliGRAM(s) Oral every 12 hours  furosemide    Tablet 40 milliGRAM(s) Oral daily  docusate sodium 100 milliGRAM(s) Oral daily  pantoprazole    Tablet 40 milliGRAM(s) Oral before breakfast  insulin glargine Injectable (LANTUS) 12 Unit(s) SubCutaneous at bedtime  insulin lispro Injectable (HumaLOG) 7 Unit(s) SubCutaneous three times a day before meals                            13.0   6.9   )-----------( 301      ( 17 Jul 2017 14:55 )             38.1       Hemoglobin: 13.0 g/dL (07-17 @ 14:55)      07-17    141  |  99  |  23  ----------------------------<  155<H>  4.2   |  22  |  0.69    Ca    10.0      17 Jul 2017 14:55    TPro  7.8  /  Alb  4.4  /  TBili  0.3  /  DBili  x   /  AST  18  /  ALT  20  /  AlkPhos  63  07-17    Creatinine Trend: 0.69<--    COAGS:     CARDIAC MARKERS ( 17 Jul 2017 21:37 )  x     / <0.01 ng/mL / x     / x     / x      CARDIAC MARKERS ( 17 Jul 2017 14:55 )  x     / <0.01 ng/mL / x     / x     / x            T(C): 37.2 (07-18-17 @ 20:49), Max: 37.5 (07-18-17 @ 14:22)  HR: 102 (07-18-17 @ 20:49) (86 - 102)  BP: 113/73 (07-18-17 @ 20:49) (95/57 - 125/76)  RR: 18 (07-18-17 @ 20:49) (17 - 19)  SpO2: 97% (07-18-17 @ 20:49) (95% - 98%)  Wt(kg): --    I&O's Summary      Daily Height in cm: 152.4 (18 Jul 2017 14:22)    Daily       Appearance: Normal	  HEENT:   Normal oral mucosa, PERRL, EOMI	  Lymphatic: No lymphadenopathy , no edema  Cardiovascular: Normal S1 S2, No JVD, No murmurs , Peripheral pulses palpable 2+ bilaterally  Respiratory: Lungs clear to auscultation, normal effort 	  Gastrointestinal:  Soft, Non-tender, + BS	  Skin: No rashes, No ecchymoses, No cyanosis, warm to touch  Musculoskeletal: Normal range of motion, normal strength  Psychiatry:  Mood & affect appropriate    TELEMETRY:  NSR	    ECG:  	  RADIOLOGY:   DIAGNOSTIC TESTING:  [ ] Echocardiogram:  [ ]  Catheterization:  [ ] Stress Test:    OTHER: 	        ASSESSMENT/PLAN: 	52y Female DM,HTN, Chol, hyperthyroidism, CVA, Multiple sclerosis admitted with 2 weeks of SOB, CROW and atypical CP r/o for MI    cardiac marker neg x 2  ASA, statin , coreg,. ARB,   daily lasix   echo pending  pt unable to tolerate NST , plan for Cardiac cath this week to R/O CAD .  tele stable   GI / DVT prophylaxis.  keep K>4, mag >2.0   Endo follow up   D/W Dr Cunningham

## 2017-07-19 NOTE — DISCHARGE NOTE ADULT - HOSPITAL COURSE
PMD 53yo F pmhx of DM,HTN ,HLD ,Hyperthyroidism  presented  with progressive dyspnea on exertion x 2 weeks and 1 episodes of left sided chest pain that lasted minutes. Pain came on at rest. no radiation. did not take meds for pain. No nausea, vomiting, diaphoresis, no focal numbness or weakness. Pt endorses recent medication change 3-4 days ago from spironolactone to diovan. Endorses bl le edema. She went to do stress test today but couldnt tolerate .   DXed with Chest pain/SOB of unknown etiology as S/P cardiac cath ,CTA chest and Echocardiogram,Type 2 DM with Hyperglycemia with HTN with Hyperthyroidism with HLD with left Breast/Axillar nodule . Followed by cardiology and Endocrinology. Pt was asymptomatic today so dcd home to follow up with PCP and GYn Mammogram as well follow up.     cardiac cath:   PROCEDURE:  --  Left heart catheterization.  --  Left coronary angiography.  --  Right coronary angiography.  TECHNIQUE: The risks and alternatives of the procedures and conscious  sedation were explained to the patient and informed consent was obtained.  Cardiac catheterization performed electively.  Right radial artery access. Local anesthetic given. A was inserted in the  vesselutilizing the modified Seldinger technique. Local anesthetic given.  Right radial artery access. Left heart catheterization. Left coronary  artery angiography. The vessel was injected utilizing a catheter. Right  coronary artery angiography. The vessel was injected utilizing a catheter.  RADIATION EXPOSURE: 4.1 min.  CONTRAST GIVEN: Omnipaque 32 ml.  HEMOSTASIS: The sheath was removed. The site was compressed with a Hemoband  device. Hemostasis was successful.  MEDICATIONS GIVEN: Midazolam, 1 mg, IV. Fentanyl, 25 mcg, IV. Verapamil  (Isoptin, Calan, Covera), 2.5 mg, IA. Heparin, 3000 units, IA.  CORONARY VESSELS: The coronary circulation is right dominant.  LM:   --  LM: Normal.  LAD:   --  LAD: Normal.  CX:   --  Circumflex: Normal.  RCA:   --  RCA: Normal.  COMPLICATIONS: There were no complications.  DIAGNOSTIC IMPRESSIONS: The coronary anatomy is normal.  DIAGNOSTIC RECOMMENDATIONS: The patient should continue with the present  medications.  Prepared and signed by  Hernando Villanueva M.D.    CTA chest : IMPRESSION: No pulmonary embolism.    1.5 x 0.7 cm left deep left breast/axilla lymph node. Recommend   correlation with mammography and ultrasound.    Echocardiogram: Conclusions:  Technically difficult study.  1. Normal mitral valve. Minimal mitral regurgitation.  2. Aortic valve not well visualized; appears trileaflet  with normal opening. No aortic valve regurgitation seen.  3. Endocardial visualization enhanced with intravenous  injection of echo contrast (Definity). Normal left  ventricular systolic function. No segmental wall motion  abnormalities.  4. The right ventricle is not well visualized; grossly  normal right ventricular systolic function.  *** No previous Echo exam.  -------------------------------------------------------

## 2017-07-19 NOTE — DISCHARGE NOTE ADULT - PROVIDER TOKENS
TOKEN:'[id number]',FREE:[LAST:[Yumiko],FIRST:[Dipti],PHONE:[(   )    -],FAX:[(   )    -],ADDRESS:[Endocrinolgy]]

## 2017-07-19 NOTE — PROGRESS NOTE ADULT - PROBLEM SELECTOR PLAN 1
Will continue current insulin regimen for now. Will continue monitoring FS, log, will Follow up.  Will increase Lantus to 18 units at bed time.  Will increase Humalog to 9 units before each meal in addition to Humalog correction scale coverage.  Patient counseled for compliance with consistent low carb diet.  Will request diabetic teaching since patient will be discharged home on insulin.

## 2017-07-19 NOTE — DISCHARGE NOTE ADULT - MEDICATION SUMMARY - MEDICATIONS TO TAKE
I will START or STAY ON the medications listed below when I get home from the hospital:    aspirin 81 mg oral delayed release tablet  -- 1 tab(s) by mouth once a day  -- Indication: For Cad    valsartan 80 mg oral tablet  -- 1 tab(s) by mouth once a day  -- Indication: For Hypertension    pregabalin 150 mg oral capsule  -- 1 cap(s) by mouth 2 times a day  -- Indication: For Neuropathy due to type 2 diabetes mellitus    escitalopram 20 mg oral tablet  -- 1 tab(s) by mouth once a day  -- Indication: For Depression     Victoza 18 mg/3 mL subcutaneous solution  -- 1.8 milligram(s) subcutaneous once a day  -- Indication: For Diabetes    Jentadueto XR 5 mg-1000 mg oral tablet, extended release  -- 1 tab(s) by mouth once a day  -- Indication: For Diabetes    atorvastatin 40 mg oral tablet  -- 1 tab(s) by mouth once a day (at bedtime)  -- Indication: For Cad    methIMAzole 5 mg oral tablet  -- 1 tab(s) by mouth once a day  -- Indication: For Thyroid     carvedilol 6.25 mg oral tablet  -- 1 tab(s) by mouth every 12 hours  -- Indication: For Heart     Colace 100 mg oral capsule  -- 1 cap(s) by mouth once a day (at bedtime)  -- Indication: For Stool softner     omeprazole 40 mg oral delayed release capsule  -- 1 cap(s) by mouth once a day  -- Indication: For Gerd I will START or STAY ON the medications listed below when I get home from the hospital:    aspirin 81 mg oral delayed release tablet  -- 1 tab(s) by mouth once a day  -- Indication: For Cad    valsartan 80 mg oral tablet  -- 1 tab(s) by mouth once a day  -- Indication: For Hypertension    pregabalin 150 mg oral capsule  -- 1 cap(s) by mouth 2 times a day  -- Indication: For Neuropathy due to type 2 diabetes mellitus    escitalopram 20 mg oral tablet  -- 1 tab(s) by mouth once a day  -- Indication: For Depression     Victoza 18 mg/3 mL subcutaneous solution  -- 1.8 milligram(s) subcutaneous once a day  -- Indication: For Diabetes    Jentadueto XR 5 mg-1000 mg oral tablet, extended release  -- 1 tab(s) by mouth once a day  -- Indication: For Diabetes    atorvastatin 40 mg oral tablet  -- 1 tab(s) by mouth once a day (at bedtime)  -- Indication: For Cad    methIMAzole 5 mg oral tablet  -- 1 tab(s) by mouth once a day  -- Indication: For Thyroid     carvedilol 6.25 mg oral tablet  -- 1 tab(s) by mouth every 12 hours  -- Indication: For Heart     hydroCHLOROthiazide 12.5 mg oral capsule  -- 1 cap(s) by mouth once a day  -- Avoid prolonged or excessive exposure to direct and/or artificial sunlight while taking this medication.  It is very important that you take or use this exactly as directed.  Do not skip doses or discontinue unless directed by your doctor.  It may be advisable to drink a full glass orange juice or eat a banana daily while taking this medication.  Take with food or milk.    -- Indication: For Hypertension    Colace 100 mg oral capsule  -- 1 cap(s) by mouth once a day (at bedtime)  -- Indication: For Stool softner     omeprazole 40 mg oral delayed release capsule  -- 1 cap(s) by mouth once a day  -- Indication: For Gerd

## 2017-07-19 NOTE — DISCHARGE NOTE ADULT - PATIENT PORTAL LINK FT
“You can access the [employer] Patient Portal, offered by [facility], by registering with the following website: [email or web address]”

## 2017-07-19 NOTE — DISCHARGE NOTE ADULT - PLAN OF CARE
Resolved Take meds as directed  HgA1C this admission. 8.2  Make sure you get your HgA1c checked every three months.  If you take oral diabetes medications, check your blood glucose two times a day.  It's important not to skip any meals.  Keep a log of your blood glucose results and always take it with you to your doctor appointments.  Keep a list of your current medications including injectables and over the counter medications and bring this medication list with you to all your doctor appointments.  If you have not seen your ophthalmologist this year call for appointment.  Check your feet daily for redness, sores, or openings. Do not self treat. If no improvement in two days call your primary care physician for an appointment.  Low blood sugar (hypoglycemia) is a blood sugar below 70mg/dl. Check your blood sugar if you feel signs/symptoms of hypoglycemia. If your blood sugar is below 70 take 15 grams of carbohydrates (ex 4 oz of apple juice, 3-4 glucose tablets, or 4-6 oz of regular soda) wait 15 minutes and repeat blood sugar to make sure it comes up above 70.  If your blood sugar is above 70 and you are due for a meal, have a meal.  If you are not due for a meal have a snack.  This snack helps keeps your blood sugar at a safe range. Take meds as directed   diet & exercise HOME CARE INSTRUCTIONS  For the next few days, avoid physical activities that bring on chest pain. Continue physical activities as directed.  Do not smoke.  Avoid drinking alcohol.   Only take over-the-counter or prescription medicine for pain, discomfort, or fever as directed by your caregiver.  Follow your caregiver's suggestions for further testing if your chest pain does not go away.  Keep any follow-up appointments you made. If you do not go to an appointment, you could develop lasting (chronic) problems with pain. If there is any problem keeping an appointment, you must call to reschedule.   SEEK MEDICAL CARE IF:  You think you are having problems from the medicine you are taking. Read your medicine instructions carefully.  Your chest pain does not go away, even after treatment.  You develop a rash with blisters on your chest.  SEEK IMMEDIATE MEDICAL CARE IF:  You have increased chest pain or pain that spreads to your arm, neck, jaw, back, or abdomen.   You develop shortness of breath, an increasing cough, or you are coughing up blood.  You have severe back or abdominal pain, feel nauseous, or vomit.  You develop severe weakness, fainting, or chills.  You have a fever.  THIS IS AN EMERGENCY. Do not wait to see if the pain will go away. Get medical help at once. Call your local emergency services (____[phone]). Do not drive yourself to the hospital.

## 2017-07-19 NOTE — DISCHARGE NOTE ADULT - MEDICATION SUMMARY - MEDICATIONS TO STOP TAKING
I will STOP taking the medications listed below when I get home from the hospital:    fenofibrate 134 mg oral capsule  -- 1 cap(s) by mouth once a day    furosemide 40 mg oral tablet  -- 1 tab(s) by mouth once a day

## 2017-07-19 NOTE — DISCHARGE NOTE ADULT - CARE PLAN
Principal Discharge DX:	Chest pain, unspecified type  Goal:	Resolved  Instructions for follow-up, activity and diet:	HOME CARE INSTRUCTIONS  For the next few days, avoid physical activities that bring on chest pain. Continue physical activities as directed.  Do not smoke.  Avoid drinking alcohol.   Only take over-the-counter or prescription medicine for pain, discomfort, or fever as directed by your caregiver.  Follow your caregiver's suggestions for further testing if your chest pain does not go away.  Keep any follow-up appointments you made. If you do not go to an appointment, you could develop lasting (chronic) problems with pain. If there is any problem keeping an appointment, you must call to reschedule.   SEEK MEDICAL CARE IF:  You think you are having problems from the medicine you are taking. Read your medicine instructions carefully.  Your chest pain does not go away, even after treatment.  You develop a rash with blisters on your chest.  SEEK IMMEDIATE MEDICAL CARE IF:  You have increased chest pain or pain that spreads to your arm, neck, jaw, back, or abdomen.   You develop shortness of breath, an increasing cough, or you are coughing up blood.  You have severe back or abdominal pain, feel nauseous, or vomit.  You develop severe weakness, fainting, or chills.  You have a fever.  THIS IS AN EMERGENCY. Do not wait to see if the pain will go away. Get medical help at once. Call your local emergency services (____[phone]). Do not drive yourself to the hospital.  Secondary Diagnosis:	Diabetes  Instructions for follow-up, activity and diet:	Take meds as directed  HgA1C this admission. 8.2  Make sure you get your HgA1c checked every three months.  If you take oral diabetes medications, check your blood glucose two times a day.  It's important not to skip any meals.  Keep a log of your blood glucose results and always take it with you to your doctor appointments.  Keep a list of your current medications including injectables and over the counter medications and bring this medication list with you to all your doctor appointments.  If you have not seen your ophthalmologist this year call for appointment.  Check your feet daily for redness, sores, or openings. Do not self treat. If no improvement in two days call your primary care physician for an appointment.  Low blood sugar (hypoglycemia) is a blood sugar below 70mg/dl. Check your blood sugar if you feel signs/symptoms of hypoglycemia. If your blood sugar is below 70 take 15 grams of carbohydrates (ex 4 oz of apple juice, 3-4 glucose tablets, or 4-6 oz of regular soda) wait 15 minutes and repeat blood sugar to make sure it comes up above 70.  If your blood sugar is above 70 and you are due for a meal, have a meal.  If you are not due for a meal have a snack.  This snack helps keeps your blood sugar at a safe range.  Secondary Diagnosis:	Hypertension  Instructions for follow-up, activity and diet:	Take meds as directed   diet & exercise

## 2017-07-19 NOTE — PROGRESS NOTE ADULT - SUBJECTIVE AND OBJECTIVE BOX
INTERVAL HPI/OVERNIGHT EVENTS: Feel fine and s/p cath   Vital Signs Last 24 Hrs  T(C): 36.9 (19 Jul 2017 11:48), Max: 37.2 (18 Jul 2017 20:49)  T(F): 98.4 (19 Jul 2017 11:48), Max: 99 (18 Jul 2017 20:49)  HR: 92 (19 Jul 2017 11:48) (90 - 102)  BP: 101/66 (19 Jul 2017 11:48) (101/66 - 124/78)  BP(mean): --  RR: 17 (19 Jul 2017 11:48) (17 - 18)  SpO2: 95% (19 Jul 2017 11:48) (95% - 97%)  I&O's Summary    18 Jul 2017 07:01  -  19 Jul 2017 07:00  --------------------------------------------------------  IN: 100 mL / OUT: 0 mL / NET: 100 mL    19 Jul 2017 07:01  -  19 Jul 2017 16:37  --------------------------------------------------------  IN: 480 mL / OUT: 0 mL / NET: 480 mL      MEDICATIONS  (STANDING):  sodium chloride 0.9% lock flush 3 milliLiter(s) IV Push every 12 hours  methimazole 5 milliGRAM(s) Oral daily  valsartan 320 milliGRAM(s) Oral daily  insulin lispro (HumaLOG) corrective regimen sliding scale   SubCutaneous three times a day before meals  insulin lispro (HumaLOG) corrective regimen sliding scale   SubCutaneous at bedtime  dextrose 5%. 1000 milliLiter(s) (50 mL/Hr) IV Continuous <Continuous>  dextrose 50% Injectable 12.5 Gram(s) IV Push once  dextrose 50% Injectable 25 Gram(s) IV Push once  dextrose 50% Injectable 25 Gram(s) IV Push once  enoxaparin Injectable 40 milliGRAM(s) SubCutaneous daily  aspirin enteric coated 81 milliGRAM(s) Oral daily  atorvastatin 40 milliGRAM(s) Oral at bedtime  pregabalin 150 milliGRAM(s) Oral two times a day  escitalopram 20 milliGRAM(s) Oral daily  carvedilol 12.5 milliGRAM(s) Oral every 12 hours  furosemide    Tablet 40 milliGRAM(s) Oral daily  docusate sodium 100 milliGRAM(s) Oral daily  pantoprazole    Tablet 40 milliGRAM(s) Oral before breakfast  insulin glargine Injectable (LANTUS) 12 Unit(s) SubCutaneous at bedtime  insulin lispro Injectable (HumaLOG) 7 Unit(s) SubCutaneous three times a day before meals    MEDICATIONS  (PRN):  dextrose Gel 1 Dose(s) Oral once PRN Blood Glucose LESS THAN 70 milliGRAM(s)/deciliter  glucagon  Injectable 1 milliGRAM(s) IntraMuscular once PRN Glucose LESS THAN 70 milligrams/deciliter    LABS:                        11.3   5.45  )-----------( 289      ( 19 Jul 2017 07:35 )             35.0     07-19    137  |  97  |  24<H>  ----------------------------<  184<H>  4.2   |  23  |  0.64    Ca    9.7      19 Jul 2017 07:40          CAPILLARY BLOOD GLUCOSE  239 (19 Jul 2017 11:48)  205 (19 Jul 2017 08:38)  312 (18 Jul 2017 20:49)              REVIEW OF SYSTEMS:  CONSTITUTIONAL: No fever, weight loss, or fatigue  EYES: No eye pain, visual disturbances, or discharge  ENMT:  No difficulty hearing, tinnitus, vertigo; No sinus or throat pain  NECK: No pain or stiffness  BREASTS: No pain, masses, or nipple discharge  RESPIRATORY: No cough, wheezing, chills or hemoptysis; No shortness of breath  CARDIOVASCULAR: No chest pain, palpitations, dizziness, or leg swelling  GASTROINTESTINAL: No abdominal or epigastric pain. No nausea, vomiting, or hematemesis; No diarrhea or constipation. No melena or hematochezia.  GENITOURINARY: No dysuria, frequency, hematuria, or incontinence  NEUROLOGICAL: No headaches, memory loss, loss of strength, numbness, or tremors  SKIN: No itching, burning, rashes, or lesions   LYMPH NODES: No enlarged glands  ENDOCRINE: No heat or cold intolerance; No hair loss  MUSCULOSKELETAL: No joint pain or swelling; No muscle, back, or extremity pain  PSYCHIATRIC: No depression, anxiety, mood swings, or difficulty sleeping  HEME/LYMPH: No easy bruising, or bleeding gums  ALLERY AND IMMUNOLOGIC: No hives or eczema    RADIOLOGY & ADDITIONAL TESTS:    Consultant(s) Notes Reviewed:  [x ] YES  [ ] NO    PHYSICAL EXAM:  GENERAL: NAD, well-groomed, well-developed,not in any distress ,  HEAD:  Atraumatic, Normocephalic  EYES: EOMI, PERRLA, conjunctiva and sclera clear  ENMT: No tonsillar erythema, exudates, or enlargement; Moist mucous membranes, Good dentition, No lesions  NECK: Supple, No JVD, Normal thyroid  NERVOUS SYSTEM:  Alert & Oriented X3, No focal deficit   CHEST/LUNG: Good air entry bilateral with no  rales, rhonchi, wheezing, or rubs  HEART: Regular rate and rhythm; No murmurs, rubs, or gallops  ABDOMEN: Soft, Nontender, Nondistended; Bowel sounds present  EXTREMITIES:  2+ Peripheral Pulses, No clubbing, cyanosis, or edema  SKIN: No rashes or lesions    Care Discussed with Consultants/Other Providers [ x] YES  [ ] NO

## 2017-07-20 DIAGNOSIS — R07.9 CHEST PAIN, UNSPECIFIED: ICD-10-CM

## 2017-07-20 DIAGNOSIS — E13.628 OTHER SPECIFIED DIABETES MELLITUS WITH OTHER SKIN COMPLICATIONS: ICD-10-CM

## 2017-07-20 DIAGNOSIS — R06.02 SHORTNESS OF BREATH: ICD-10-CM

## 2017-07-20 LAB — TSH RECEP AB FLD-ACNC: 1.2 IU/L — SIGNIFICANT CHANGE UP (ref 0–1.75)

## 2017-07-20 PROCEDURE — 71275 CT ANGIOGRAPHY CHEST: CPT | Mod: 26

## 2017-07-20 RX ORDER — ALPRAZOLAM 0.25 MG
0.25 TABLET ORAL ONCE
Qty: 0 | Refills: 0 | Status: DISCONTINUED | OUTPATIENT
Start: 2017-07-20 | End: 2017-07-20

## 2017-07-20 RX ORDER — VALSARTAN 80 MG/1
160 TABLET ORAL DAILY
Qty: 0 | Refills: 0 | Status: DISCONTINUED | OUTPATIENT
Start: 2017-07-20 | End: 2017-07-21

## 2017-07-20 RX ORDER — SODIUM CHLORIDE 9 MG/ML
1000 INJECTION INTRAMUSCULAR; INTRAVENOUS; SUBCUTANEOUS
Qty: 0 | Refills: 0 | Status: DISCONTINUED | OUTPATIENT
Start: 2017-07-20 | End: 2017-07-21

## 2017-07-20 RX ORDER — SODIUM CHLORIDE 9 MG/ML
1000 INJECTION INTRAMUSCULAR; INTRAVENOUS; SUBCUTANEOUS
Qty: 0 | Refills: 0 | Status: DISCONTINUED | OUTPATIENT
Start: 2017-07-20 | End: 2017-07-20

## 2017-07-20 RX ADMIN — SODIUM CHLORIDE 3 MILLILITER(S): 9 INJECTION INTRAMUSCULAR; INTRAVENOUS; SUBCUTANEOUS at 05:54

## 2017-07-20 RX ADMIN — VALSARTAN 320 MILLIGRAM(S): 80 TABLET ORAL at 06:21

## 2017-07-20 RX ADMIN — Medication 7 UNIT(S): at 18:42

## 2017-07-20 RX ADMIN — Medication 100 MILLIGRAM(S): at 11:37

## 2017-07-20 RX ADMIN — Medication 2: at 18:42

## 2017-07-20 RX ADMIN — SODIUM CHLORIDE 100 MILLILITER(S): 9 INJECTION INTRAMUSCULAR; INTRAVENOUS; SUBCUTANEOUS at 11:37

## 2017-07-20 RX ADMIN — SODIUM CHLORIDE 3 MILLILITER(S): 9 INJECTION INTRAMUSCULAR; INTRAVENOUS; SUBCUTANEOUS at 18:35

## 2017-07-20 RX ADMIN — Medication 7 UNIT(S): at 08:11

## 2017-07-20 RX ADMIN — Medication 7 UNIT(S): at 12:23

## 2017-07-20 RX ADMIN — ESCITALOPRAM OXALATE 20 MILLIGRAM(S): 10 TABLET, FILM COATED ORAL at 11:37

## 2017-07-20 RX ADMIN — CARVEDILOL PHOSPHATE 12.5 MILLIGRAM(S): 80 CAPSULE, EXTENDED RELEASE ORAL at 06:21

## 2017-07-20 RX ADMIN — Medication 0.25 MILLIGRAM(S): at 18:35

## 2017-07-20 RX ADMIN — ENOXAPARIN SODIUM 40 MILLIGRAM(S): 100 INJECTION SUBCUTANEOUS at 11:37

## 2017-07-20 RX ADMIN — Medication 150 MILLIGRAM(S): at 18:39

## 2017-07-20 RX ADMIN — Medication 81 MILLIGRAM(S): at 11:37

## 2017-07-20 RX ADMIN — Medication 4: at 12:23

## 2017-07-20 RX ADMIN — Medication 6: at 08:11

## 2017-07-20 RX ADMIN — ATORVASTATIN CALCIUM 40 MILLIGRAM(S): 80 TABLET, FILM COATED ORAL at 22:18

## 2017-07-20 RX ADMIN — INSULIN GLARGINE 12 UNIT(S): 100 INJECTION, SOLUTION SUBCUTANEOUS at 22:18

## 2017-07-20 RX ADMIN — CARVEDILOL PHOSPHATE 12.5 MILLIGRAM(S): 80 CAPSULE, EXTENDED RELEASE ORAL at 18:39

## 2017-07-20 RX ADMIN — Medication 0.5 MILLIGRAM(S): at 16:08

## 2017-07-20 RX ADMIN — PANTOPRAZOLE SODIUM 40 MILLIGRAM(S): 20 TABLET, DELAYED RELEASE ORAL at 06:21

## 2017-07-20 RX ADMIN — Medication 150 MILLIGRAM(S): at 06:21

## 2017-07-20 RX ADMIN — Medication 40 MILLIGRAM(S): at 06:21

## 2017-07-20 NOTE — DIETITIAN INITIAL EVALUATION ADULT. - ORAL INTAKE PTA
good/Pt reports a Good PO intake. Breakfast: 2 eggs, 2 slices whole grain bread, coffee with 2 teaspoons raw sugar. Lunch; cold cut sandwich on rice cakes or 2 slices whole grain bread. Dinner: chicken, meat pork. vegetables, rice or sweet potato. Pt drinks water or seltzer. Pt snacks on rice cakes or whole grain crackers.

## 2017-07-20 NOTE — PROGRESS NOTE ADULT - SUBJECTIVE AND OBJECTIVE BOX
Subjective: pt seen and examined, No new compaints    sodium chloride 0.9% lock flush 3 milliLiter(s) IV Push every 12 hours  methimazole 5 milliGRAM(s) Oral daily  valsartan 320 milliGRAM(s) Oral daily  insulin lispro (HumaLOG) corrective regimen sliding scale   SubCutaneous three times a day before meals  insulin lispro (HumaLOG) corrective regimen sliding scale   SubCutaneous at bedtime  dextrose 5%. 1000 milliLiter(s) IV Continuous <Continuous>  dextrose Gel 1 Dose(s) Oral once PRN  dextrose 50% Injectable 12.5 Gram(s) IV Push once  dextrose 50% Injectable 25 Gram(s) IV Push once  dextrose 50% Injectable 25 Gram(s) IV Push once  glucagon  Injectable 1 milliGRAM(s) IntraMuscular once PRN  enoxaparin Injectable 40 milliGRAM(s) SubCutaneous daily  aspirin enteric coated 81 milliGRAM(s) Oral daily  atorvastatin 40 milliGRAM(s) Oral at bedtime  pregabalin 150 milliGRAM(s) Oral two times a day  escitalopram 20 milliGRAM(s) Oral daily  carvedilol 12.5 milliGRAM(s) Oral every 12 hours  furosemide    Tablet 40 milliGRAM(s) Oral daily  docusate sodium 100 milliGRAM(s) Oral daily  pantoprazole    Tablet 40 milliGRAM(s) Oral before breakfast  insulin glargine Injectable (LANTUS) 12 Unit(s) SubCutaneous at bedtime  insulin lispro Injectable (HumaLOG) 7 Unit(s) SubCutaneous three times a day before meals                            11.3   5.45  )-----------( 289      ( 19 Jul 2017 07:35 )             35.0       Hemoglobin: 11.3 g/dL (07-19 @ 07:35)  Hemoglobin: 13.0 g/dL (07-17 @ 14:55)      07-19    137  |  97  |  24<H>  ----------------------------<  184<H>  4.2   |  23  |  0.64    Ca    9.7      19 Jul 2017 07:40      Creatinine Trend: 0.64<--, 0.69<--    COAGS:     CARDIAC MARKERS ( 17 Jul 2017 21:37 )  x     / <0.01 ng/mL / x     / x     / x      CARDIAC MARKERS ( 17 Jul 2017 14:55 )  x     / <0.01 ng/mL / x     / x     / x            T(C): 36.5 (07-20-17 @ 06:20), Max: 37.4 (07-19-17 @ 18:15)  HR: 93 (07-20-17 @ 06:20) (82 - 101)  BP: 107/67 (07-20-17 @ 06:20) (94/58 - 118/70)  RR: 18 (07-20-17 @ 06:20) (17 - 18)  SpO2: 96% (07-20-17 @ 06:20) (94% - 96%)  Wt(kg): --    I&O's Summary    18 Jul 2017 07:01  -  19 Jul 2017 07:00  --------------------------------------------------------  IN: 100 mL / OUT: 0 mL / NET: 100 mL    19 Jul 2017 07:01  -  20 Jul 2017 06:58  --------------------------------------------------------  IN: 820 mL / OUT: 1 mL / NET: 819 mL        Daily     Daily     Appearance: Normal	  HEENT:   Normal oral mucosa, PERRL, EOMI	  Lymphatic: No lymphadenopathy , no edema  Cardiovascular: Normal S1 S2, No JVD, No murmurs , Peripheral pulses palpable 2+ bilaterally  Respiratory: Lungs clear to auscultation, normal effort 	  Gastrointestinal:  Soft, Non-tender, + BS	  Skin: No rashes, No ecchymoses, No cyanosis, warm to touch  Musculoskeletal: Normal range of motion, normal strength  Psychiatry:  Mood & affect appropriate    TELEMETRY:  NSR	    ECG:  	  RADIOLOGY:   DIAGNOSTIC TESTING:  [x ] Echocardiogram:  < from: TTE with Doppler (w/Cont) (07.19.17 @ 11:12) >  Conclusions:  Technically difficult study.  1. Normal mitral valve. Minimal mitral regurgitation.  2. Aortic valve not well visualized; appears trileaflet  with normal opening. No aortic valve regurgitation seen.  3. Endocardial visualization enhanced with intravenous  injection of echo contrast (Definity). Normal left  ventricular systolic function. No segmental wall motion  abnormalities.  4. The right ventricle is not well visualized; grossly  normal right ventricular systolic function.  *** No previous Echo exam.  ------------------------------------------------------------------------  Confirmed on  7/19/2017 - 08:59:56 by Tracey Oquendo M.D.  ------------------------------------------------------------------------    < end of copied text >     ]  Catheterization:  [ ] Stress Test:    OTHER: 	        ASSESSMENT/PLAN: 	52y Female DM,HTN, Chol, hyperthyroidism, CVA, Multiple sclerosis admitted with 2 weeks of SOB, CROW and atypical CP r/o for MI, s/p cath      cardiac marker neg x 2  ASA, statin , coreg,. ARB,   daily lasix   echo noted  s/p cath with no nonobs CAD.  tele stable   GI / DVT prophylaxis.  keep K>4, mag >2.0   Endo follow up   D/c planning  D/W Dr Cunningham

## 2017-07-20 NOTE — DIETITIAN INITIAL EVALUATION ADULT. - NS AS NUTRI INTERV COLLABORAT3
Collaboration with other providers/Malnutrition sticker for BMI ?40kg/m2 placed in chart. NP made aware.

## 2017-07-20 NOTE — PROGRESS NOTE ADULT - SUBJECTIVE AND OBJECTIVE BOX
Chief complaint  Patient is a 52y old  Female who presents with a chief complaint of ACS (19 Jul 2017 17:34)   Review of systems  Patient in bed, comfortable, no fever, no hypoglycemia.    Labs and Fingesticks    CAPILLARY BLOOD GLUCOSE  204 (20 Jul 2017 12:03)  273 (20 Jul 2017 07:43)  198 (19 Jul 2017 21:30)  228 (19 Jul 2017 17:45)  239 (19 Jul 2017 11:48)  205 (19 Jul 2017 08:38)  312 (18 Jul 2017 20:49)  191 (18 Jul 2017 16:36)    Anion Gap, Serum: 17 (07-19 @ 07:40)      Calcium, Total Serum: 9.7 (07-19 @ 07:40)          07-19    137  |  97  |  24<H>  ----------------------------<  184<H>  4.2   |  23  |  0.64    Ca    9.7      19 Jul 2017 07:40                          11.3   5.45  )-----------( 289      ( 19 Jul 2017 07:35 )             35.0     Medications  MEDICATIONS  (STANDING):  sodium chloride 0.9% lock flush 3 milliLiter(s) IV Push every 12 hours  methimazole 5 milliGRAM(s) Oral daily  valsartan 320 milliGRAM(s) Oral daily  insulin lispro (HumaLOG) corrective regimen sliding scale   SubCutaneous three times a day before meals  insulin lispro (HumaLOG) corrective regimen sliding scale   SubCutaneous at bedtime  dextrose 5%. 1000 milliLiter(s) (50 mL/Hr) IV Continuous <Continuous>  dextrose 50% Injectable 12.5 Gram(s) IV Push once  dextrose 50% Injectable 25 Gram(s) IV Push once  dextrose 50% Injectable 25 Gram(s) IV Push once  enoxaparin Injectable 40 milliGRAM(s) SubCutaneous daily  aspirin enteric coated 81 milliGRAM(s) Oral daily  atorvastatin 40 milliGRAM(s) Oral at bedtime  pregabalin 150 milliGRAM(s) Oral two times a day  escitalopram 20 milliGRAM(s) Oral daily  carvedilol 12.5 milliGRAM(s) Oral every 12 hours  docusate sodium 100 milliGRAM(s) Oral daily  pantoprazole    Tablet 40 milliGRAM(s) Oral before breakfast  insulin glargine Injectable (LANTUS) 12 Unit(s) SubCutaneous at bedtime  insulin lispro Injectable (HumaLOG) 7 Unit(s) SubCutaneous three times a day before meals  sodium chloride 0.9%. 1000 milliLiter(s) (100 mL/Hr) IV Continuous <Continuous>      Physical Exam  General: Patient comfortable in bed  Vital Signs Last 12 Hrs  T(F): 99.5 (07-20-17 @ 09:48), Max: 99.5 (07-20-17 @ 09:48)  HR: 93 (07-20-17 @ 09:48) (93 - 93)  BP: 95/62 (07-20-17 @ 09:48) (95/62 - 107/67)  BP(mean): --  RR: 18 (07-20-17 @ 09:48) (18 - 18)  SpO2: 94% (07-20-17 @ 09:48) (94% - 96%)  Neck: No palpable thyroid nodules.  CVS: S1S2, No murmurs  Respiratory: No wheezing, no crepitations  GI: Abdomen soft, bowel sounds positive  Musculoskeletal: Positive edema lower extremities bilaterally  Skin: No skin rashes, no ecchimosis    Diagnostics    Thyroid Stimulating Hormone, Serum: AM Sched. Collection: 19-Jul-2017 06:00 (07-18 @ 22:26)  Free Thyroxine, Serum: AM Sched. Collection: 19-Jul-2017 06:00 (07-18 @ 22:26)  Thyroperoxidase Antibody: AM Sched. Collection: 19-Jul-2017 06:00 (07-18 @ 22:26)  TSH Receptor Antibody: AM Sched. Collection: 19-Jul-2017 06:00 (07-18 @ 22:26)

## 2017-07-20 NOTE — PROGRESS NOTE ADULT - PROBLEM SELECTOR PLAN 1
S/P echocardiogram and cardiac cath...cardiology helping . D/W Remote possibility of PE as no clear risk factors but wants to have CTA chest so getting today

## 2017-07-20 NOTE — DIETITIAN INITIAL EVALUATION ADULT. - ADHERENCE
fair/Pt states shes trying to loose weight and maintain her BG levels. Pt does so by consumes on whole grains, and limites her CHO intake.

## 2017-07-20 NOTE — DIETITIAN INITIAL EVALUATION ADULT. - NUTRITION INTERVENTION
Feeding Assistance/Meals and Snack/Nutrition Education Collaboration and Referral of Nutrition Care/Nutrition Education

## 2017-07-20 NOTE — CHART NOTE - NSCHARTNOTEFT_GEN_A_CORE
Upon Nutritional Assessment by the Registered Dietitian your patient was determined to meet criteria / has evidence of the following diagnosis/diagnoses:          [ ]  Mild Protein Calorie Malnutrition        [ ]  Moderate Protein Calorie Malnutrition        [ ] Severe Protein Calorie Malnutrition        [ ] Unspecified Protein Calorie Malnutrition        [ ] Underweight / BMI <19        [ X] Morbid Obesity / BMI > 40      Findings as based on:  [ ] Comprehensive nutrition assessment   [ ] Nutrition Focused Physical Exam  [X ] Other: BMI: 46.8kg/m2.       Nutrition Plan/Recommendations:      Continue DASH, Consistent CHO diet   Reviewed portion control and tips for weight loss      PROVIDER Section:     By signing this assessment you are acknowledging and agree with the diagnosis/diagnoses assigned by the Registered Dietitian    Comments:

## 2017-07-20 NOTE — DIETITIAN INITIAL EVALUATION ADULT. - ENERGY NEEDS
Ht: 5'0", Wt: 238.9lbs, BMI: 46.8kg/m2, IBW: 100lbs(+/-10%), 238%IBW  Pertinent information: Pt admitted for progressive dyspnea and CP. Per chart Pt with HTN, T2DM. Pt s/p cath with non obstructive CAD.   +1 Bill lower extremity Edema, Skin intact

## 2017-07-20 NOTE — PROGRESS NOTE ADULT - SUBJECTIVE AND OBJECTIVE BOX
INTERVAL HPI/OVERNIGHT EVENTS: Continues to have cheat pain and SOB ...  Vital Signs Last 24 Hrs  T(C): 37.2 (20 Jul 2017 13:59), Max: 37.5 (20 Jul 2017 09:48)  T(F): 99 (20 Jul 2017 13:59), Max: 99.5 (20 Jul 2017 09:48)  HR: 87 (20 Jul 2017 13:59) (82 - 101)  BP: 92/56 (20 Jul 2017 13:59) (92/56 - 118/70)  BP(mean): --  RR: 18 (20 Jul 2017 13:59) (18 - 18)  SpO2: 94% (20 Jul 2017 13:59) (94% - 96%)  I&O's Summary    19 Jul 2017 07:01  -  20 Jul 2017 07:00  --------------------------------------------------------  IN: 820 mL / OUT: 1 mL / NET: 819 mL    20 Jul 2017 07:01  -  20 Jul 2017 15:00  --------------------------------------------------------  IN: 360 mL / OUT: 0 mL / NET: 360 mL      MEDICATIONS  (STANDING):  sodium chloride 0.9% lock flush 3 milliLiter(s) IV Push every 12 hours  methimazole 5 milliGRAM(s) Oral daily  valsartan 320 milliGRAM(s) Oral daily  insulin lispro (HumaLOG) corrective regimen sliding scale   SubCutaneous three times a day before meals  insulin lispro (HumaLOG) corrective regimen sliding scale   SubCutaneous at bedtime  dextrose 5%. 1000 milliLiter(s) (50 mL/Hr) IV Continuous <Continuous>  dextrose 50% Injectable 12.5 Gram(s) IV Push once  dextrose 50% Injectable 25 Gram(s) IV Push once  dextrose 50% Injectable 25 Gram(s) IV Push once  enoxaparin Injectable 40 milliGRAM(s) SubCutaneous daily  aspirin enteric coated 81 milliGRAM(s) Oral daily  atorvastatin 40 milliGRAM(s) Oral at bedtime  pregabalin 150 milliGRAM(s) Oral two times a day  escitalopram 20 milliGRAM(s) Oral daily  carvedilol 12.5 milliGRAM(s) Oral every 12 hours  docusate sodium 100 milliGRAM(s) Oral daily  pantoprazole    Tablet 40 milliGRAM(s) Oral before breakfast  insulin glargine Injectable (LANTUS) 12 Unit(s) SubCutaneous at bedtime  insulin lispro Injectable (HumaLOG) 7 Unit(s) SubCutaneous three times a day before meals  sodium chloride 0.9%. 1000 milliLiter(s) (150 mL/Hr) IV Continuous <Continuous>    MEDICATIONS  (PRN):  dextrose Gel 1 Dose(s) Oral once PRN Blood Glucose LESS THAN 70 milliGRAM(s)/deciliter  glucagon  Injectable 1 milliGRAM(s) IntraMuscular once PRN Glucose LESS THAN 70 milligrams/deciliter    LABS:                        11.3   5.45  )-----------( 289      ( 19 Jul 2017 07:35 )             35.0     07-19    137  |  97  |  24<H>  ----------------------------<  184<H>  4.2   |  23  |  0.64    Ca    9.7      19 Jul 2017 07:40          CAPILLARY BLOOD GLUCOSE  204 (20 Jul 2017 12:03)  273 (20 Jul 2017 07:43)  198 (19 Jul 2017 21:30)  228 (19 Jul 2017 17:45)              REVIEW OF SYSTEMS:  CONSTITUTIONAL: No fever, weight loss, or fatigue  EYES: No eye pain, visual disturbances, or discharge  ENMT:  No difficulty hearing, tinnitus, vertigo; No sinus or throat pain  NECK: No pain or stiffness  BREASTS: No pain, masses, or nipple discharge  RESPIRATORY: No cough, wheezing, chills or hemoptysis; + shortness of breath  CARDIOVASCULAR:+ chest pain, NO   palpitations, dizziness, or leg swelling  GASTROINTESTINAL: No abdominal or epigastric pain. No nausea, vomiting, or hematemesis; No diarrhea or constipation. No melena or hematochezia.  GENITOURINARY: No dysuria, frequency, hematuria, or incontinence  NEUROLOGICAL: No headaches, memory loss, loss of strength, numbness, or tremors  SKIN: No itching, burning, rashes, or lesions   LYMPH NODES: No enlarged glands  ENDOCRINE: No heat or cold intolerance; No hair loss  MUSCULOSKELETAL: No joint pain or swelling; No muscle, back, or extremity pain  PSYCHIATRIC: No depression, anxiety, mood swings, or difficulty sleeping  HEME/LYMPH: No easy bruising, or bleeding gums  ALLERY AND IMMUNOLOGIC: No hives or eczema    RADIOLOGY & ADDITIONAL TESTS:    Consultant(s) Notes Reviewed:  [x ] YES  [ ] NO    PHYSICAL EXAM:  GENERAL: NAD, well-groomed, well-developed,not in any distress ,  HEAD:  Atraumatic, Normocephalic  EYES: EOMI, PERRLA, conjunctiva and sclera clear  ENMT: No tonsillar erythema, exudates, or enlargement; Moist mucous membranes, Good dentition, No lesions  NECK: Supple, No JVD, Normal thyroid  NERVOUS SYSTEM:  Alert & Oriented X3, No focal deficit   CHEST/LUNG: Good air entry bilateral with no  rales, rhonchi, wheezing, or rubs  HEART: Regular rate and rhythm; No murmurs, rubs, or gallops  ABDOMEN: Soft, Nontender, Nondistended; Bowel sounds present  EXTREMITIES:  2+ Peripheral Pulses, No clubbing, cyanosis, or edema  SKIN: No rashes or lesions    Care Discussed with Consultants/Other Providers [ x] YES  [ ] NO

## 2017-07-20 NOTE — DIETITIAN INITIAL EVALUATION ADULT. - NS AS NUTRI INTERV ED CONTENT
Nutrition relationship to health/disease/Purpose of the nutrition education/Other (specify)/Recommended modifications/Discussed consistent CHO diet education.  Reviewed foods containing CHO, portion sizes of CHO, CHO counting, pairing CHO with proteins, reading food labels, importance of monitoring blood glucose levels, importance of not skipping meals. Reviewed meal and snack options. Pt verbalized understanding and accepted written T2DM Nutrition Therapy diet education . RD remains available for diet education review

## 2017-07-20 NOTE — DIETITIAN INITIAL EVALUATION ADULT. - OTHER INFO
Nutrition consult received for Hgba1c of 8.2%. Pt reports a good PO Intake house and denies nutritional complaints. Pt has a glucometer at home, and checks BG levels 2-3x a week. Pt states her BG leves at home range between 135-170mg/dl. Pt has an endocrinologist was 3 months ago her Hgba1c was 9%, Pt was started on Victoza, Pt;s Hgba1c during this admission is 8.2%. Pt was amenable to in depth T2DM diet education as well as low NA diet education. Pt denies micronutrient supplementation. Pt denies GI distress at this time but states she takes a stool softener PTA to avoid constipation. NKFA

## 2017-07-21 VITALS
RESPIRATION RATE: 17 BRPM | DIASTOLIC BLOOD PRESSURE: 68 MMHG | OXYGEN SATURATION: 96 % | TEMPERATURE: 98 F | SYSTOLIC BLOOD PRESSURE: 104 MMHG | HEART RATE: 84 BPM

## 2017-07-21 DIAGNOSIS — N63 UNSPECIFIED LUMP IN BREAST: ICD-10-CM

## 2017-07-21 LAB
ANION GAP SERPL CALC-SCNC: 14 MMOL/L — SIGNIFICANT CHANGE UP (ref 5–17)
BUN SERPL-MCNC: 21 MG/DL — SIGNIFICANT CHANGE UP (ref 7–23)
CALCIUM SERPL-MCNC: 8.9 MG/DL — SIGNIFICANT CHANGE UP (ref 8.4–10.5)
CHLORIDE SERPL-SCNC: 99 MMOL/L — SIGNIFICANT CHANGE UP (ref 96–108)
CO2 SERPL-SCNC: 26 MMOL/L — SIGNIFICANT CHANGE UP (ref 22–31)
CREAT SERPL-MCNC: 0.62 MG/DL — SIGNIFICANT CHANGE UP (ref 0.5–1.3)
GLUCOSE SERPL-MCNC: 236 MG/DL — HIGH (ref 70–99)
POTASSIUM SERPL-MCNC: 4.3 MMOL/L — SIGNIFICANT CHANGE UP (ref 3.5–5.3)
POTASSIUM SERPL-SCNC: 4.3 MMOL/L — SIGNIFICANT CHANGE UP (ref 3.5–5.3)
SODIUM SERPL-SCNC: 139 MMOL/L — SIGNIFICANT CHANGE UP (ref 135–145)

## 2017-07-21 PROCEDURE — 82565 ASSAY OF CREATININE: CPT

## 2017-07-21 PROCEDURE — 86376 MICROSOMAL ANTIBODY EACH: CPT

## 2017-07-21 PROCEDURE — 80048 BASIC METABOLIC PNL TOTAL CA: CPT

## 2017-07-21 PROCEDURE — C8929: CPT

## 2017-07-21 PROCEDURE — 85610 PROTHROMBIN TIME: CPT

## 2017-07-21 PROCEDURE — G0378: CPT

## 2017-07-21 PROCEDURE — 83520 IMMUNOASSAY QUANT NOS NONAB: CPT

## 2017-07-21 PROCEDURE — 84439 ASSAY OF FREE THYROXINE: CPT

## 2017-07-21 PROCEDURE — 85027 COMPLETE CBC AUTOMATED: CPT

## 2017-07-21 PROCEDURE — 71045 X-RAY EXAM CHEST 1 VIEW: CPT

## 2017-07-21 PROCEDURE — 93458 L HRT ARTERY/VENTRICLE ANGIO: CPT

## 2017-07-21 PROCEDURE — 83036 HEMOGLOBIN GLYCOSYLATED A1C: CPT

## 2017-07-21 PROCEDURE — 99285 EMERGENCY DEPT VISIT HI MDM: CPT | Mod: 25

## 2017-07-21 PROCEDURE — 82803 BLOOD GASES ANY COMBINATION: CPT

## 2017-07-21 PROCEDURE — 82947 ASSAY GLUCOSE BLOOD QUANT: CPT

## 2017-07-21 PROCEDURE — 83880 ASSAY OF NATRIURETIC PEPTIDE: CPT

## 2017-07-21 PROCEDURE — 80053 COMPREHEN METABOLIC PANEL: CPT

## 2017-07-21 PROCEDURE — 84443 ASSAY THYROID STIM HORMONE: CPT

## 2017-07-21 PROCEDURE — 80061 LIPID PANEL: CPT

## 2017-07-21 PROCEDURE — C1894: CPT

## 2017-07-21 PROCEDURE — 71275 CT ANGIOGRAPHY CHEST: CPT

## 2017-07-21 PROCEDURE — 84295 ASSAY OF SERUM SODIUM: CPT

## 2017-07-21 PROCEDURE — 84132 ASSAY OF SERUM POTASSIUM: CPT

## 2017-07-21 PROCEDURE — 93005 ELECTROCARDIOGRAM TRACING: CPT | Mod: 76

## 2017-07-21 PROCEDURE — 82435 ASSAY OF BLOOD CHLORIDE: CPT

## 2017-07-21 PROCEDURE — 82330 ASSAY OF CALCIUM: CPT

## 2017-07-21 PROCEDURE — 83605 ASSAY OF LACTIC ACID: CPT

## 2017-07-21 PROCEDURE — C1769: CPT

## 2017-07-21 PROCEDURE — 84484 ASSAY OF TROPONIN QUANT: CPT

## 2017-07-21 PROCEDURE — 85730 THROMBOPLASTIN TIME PARTIAL: CPT

## 2017-07-21 PROCEDURE — 85014 HEMATOCRIT: CPT

## 2017-07-21 PROCEDURE — C1887: CPT

## 2017-07-21 RX ORDER — METHIMAZOLE 10 MG/1
1 TABLET ORAL
Qty: 30 | Refills: 0
Start: 2017-07-21 | End: 2017-08-20

## 2017-07-21 RX ORDER — ASPIRIN/CALCIUM CARB/MAGNESIUM 324 MG
1 TABLET ORAL
Qty: 0 | Refills: 0 | DISCHARGE
Start: 2017-07-21

## 2017-07-21 RX ORDER — VALSARTAN 80 MG/1
80 TABLET ORAL DAILY
Qty: 0 | Refills: 0 | Status: DISCONTINUED | OUTPATIENT
Start: 2017-07-21 | End: 2017-07-21

## 2017-07-21 RX ORDER — VALSARTAN 80 MG/1
1 TABLET ORAL
Qty: 30 | Refills: 0
Start: 2017-07-21 | End: 2017-08-20

## 2017-07-21 RX ORDER — CARVEDILOL PHOSPHATE 80 MG/1
6.25 CAPSULE, EXTENDED RELEASE ORAL EVERY 12 HOURS
Qty: 0 | Refills: 0 | Status: DISCONTINUED | OUTPATIENT
Start: 2017-07-21 | End: 2017-07-21

## 2017-07-21 RX ORDER — LINAGLIPTIN AND METFORMIN HYDROCHLORIDE 2.5; 85 MG/1; MG/1
1 TABLET, FILM COATED ORAL
Qty: 30 | Refills: 0
Start: 2017-07-21 | End: 2017-08-20

## 2017-07-21 RX ORDER — LIRAGLUTIDE 6 MG/ML
1.8 INJECTION SUBCUTANEOUS
Qty: 1 | Refills: 0
Start: 2017-07-21 | End: 2017-08-20

## 2017-07-21 RX ADMIN — Medication 6: at 12:52

## 2017-07-21 RX ADMIN — CARVEDILOL PHOSPHATE 12.5 MILLIGRAM(S): 80 CAPSULE, EXTENDED RELEASE ORAL at 06:49

## 2017-07-21 RX ADMIN — Medication 2: at 08:05

## 2017-07-21 RX ADMIN — VALSARTAN 160 MILLIGRAM(S): 80 TABLET ORAL at 08:34

## 2017-07-21 RX ADMIN — Medication 150 MILLIGRAM(S): at 06:48

## 2017-07-21 RX ADMIN — Medication 100 MILLIGRAM(S): at 12:51

## 2017-07-21 RX ADMIN — Medication 7 UNIT(S): at 12:52

## 2017-07-21 RX ADMIN — PANTOPRAZOLE SODIUM 40 MILLIGRAM(S): 20 TABLET, DELAYED RELEASE ORAL at 06:48

## 2017-07-21 RX ADMIN — ESCITALOPRAM OXALATE 20 MILLIGRAM(S): 10 TABLET, FILM COATED ORAL at 12:51

## 2017-07-21 RX ADMIN — Medication 7 UNIT(S): at 08:05

## 2017-07-21 RX ADMIN — Medication 81 MILLIGRAM(S): at 12:51

## 2017-07-21 RX ADMIN — SODIUM CHLORIDE 3 MILLILITER(S): 9 INJECTION INTRAMUSCULAR; INTRAVENOUS; SUBCUTANEOUS at 06:43

## 2017-07-21 NOTE — PROGRESS NOTE ADULT - SUBJECTIVE AND OBJECTIVE BOX
INTERVAL HPI/OVERNIGHT EVENTS: feel fine now .   Vital Signs Last 24 Hrs  T(C): 36.9 (21 Jul 2017 11:31), Max: 36.9 (21 Jul 2017 11:31)  T(F): 98.4 (21 Jul 2017 11:31), Max: 98.4 (21 Jul 2017 11:31)  HR: 84 (21 Jul 2017 11:31) (80 - 89)  BP: 104/68 (21 Jul 2017 11:31) (94/59 - 115/54)  BP(mean): --  RR: 17 (21 Jul 2017 11:31) (17 - 18)  SpO2: 96% (21 Jul 2017 11:31) (93% - 96%)  I&O's Summary    20 Jul 2017 07:01  -  21 Jul 2017 07:00  --------------------------------------------------------  IN: 2350 mL / OUT: 500 mL / NET: 1850 mL    21 Jul 2017 07:01  -  21 Jul 2017 14:02  --------------------------------------------------------  IN: 980 mL / OUT: 0 mL / NET: 980 mL      MEDICATIONS  (STANDING):  sodium chloride 0.9% lock flush 3 milliLiter(s) IV Push every 12 hours  methimazole 5 milliGRAM(s) Oral daily  insulin lispro (HumaLOG) corrective regimen sliding scale   SubCutaneous three times a day before meals  insulin lispro (HumaLOG) corrective regimen sliding scale   SubCutaneous at bedtime  dextrose 5%. 1000 milliLiter(s) (50 mL/Hr) IV Continuous <Continuous>  dextrose 50% Injectable 12.5 Gram(s) IV Push once  dextrose 50% Injectable 25 Gram(s) IV Push once  dextrose 50% Injectable 25 Gram(s) IV Push once  enoxaparin Injectable 40 milliGRAM(s) SubCutaneous daily  aspirin enteric coated 81 milliGRAM(s) Oral daily  atorvastatin 40 milliGRAM(s) Oral at bedtime  pregabalin 150 milliGRAM(s) Oral two times a day  escitalopram 20 milliGRAM(s) Oral daily  docusate sodium 100 milliGRAM(s) Oral daily  pantoprazole    Tablet 40 milliGRAM(s) Oral before breakfast  insulin glargine Injectable (LANTUS) 12 Unit(s) SubCutaneous at bedtime  insulin lispro Injectable (HumaLOG) 7 Unit(s) SubCutaneous three times a day before meals  sodium chloride 0.9%. 1000 milliLiter(s) (150 mL/Hr) IV Continuous <Continuous>  carvedilol 6.25 milliGRAM(s) Oral every 12 hours  valsartan 80 milliGRAM(s) Oral daily    MEDICATIONS  (PRN):  dextrose Gel 1 Dose(s) Oral once PRN Blood Glucose LESS THAN 70 milliGRAM(s)/deciliter  glucagon  Injectable 1 milliGRAM(s) IntraMuscular once PRN Glucose LESS THAN 70 milligrams/deciliter    LABS:    07-21    139  |  99  |  21  ----------------------------<  236<H>  4.3   |  26  |  0.62    Ca    8.9      21 Jul 2017 12:19          CAPILLARY BLOOD GLUCOSE  262 (21 Jul 2017 11:31)  187 (21 Jul 2017 07:12)  204 (20 Jul 2017 20:57)  158 (20 Jul 2017 18:35)              REVIEW OF SYSTEMS:  CONSTITUTIONAL: No fever, weight loss, or fatigue  EYES: No eye pain, visual disturbances, or discharge  ENMT:  No difficulty hearing, tinnitus, vertigo; No sinus or throat pain  NECK: No pain or stiffness  BREASTS: No pain, masses, or nipple discharge  RESPIRATORY: No cough, wheezing, chills or hemoptysis; No shortness of breath  CARDIOVASCULAR: No chest pain, palpitations, dizziness, or leg swelling  GASTROINTESTINAL: No abdominal or epigastric pain. No nausea, vomiting, or hematemesis; No diarrhea or constipation. No melena or hematochezia.  GENITOURINARY: No dysuria, frequency, hematuria, or incontinence  NEUROLOGICAL: No headaches, memory loss, loss of strength, numbness, or tremors  SKIN: No itching, burning, rashes, or lesions   LYMPH NODES: No enlarged glands  ENDOCRINE: No heat or cold intolerance; No hair loss  MUSCULOSKELETAL: No joint pain or swelling; No muscle, back, or extremity pain  PSYCHIATRIC: No depression, anxiety, mood swings, or difficulty sleeping  HEME/LYMPH: No easy bruising, or bleeding gums  ALLERY AND IMMUNOLOGIC: No hives or eczema    RADIOLOGY & ADDITIONAL TESTS:    Consultant(s) Notes Reviewed:  [x ] YES  [ ] NO    PHYSICAL EXAM:  GENERAL: NAD, well-groomed, well-developed,not in any distress ,  HEAD:  Atraumatic, Normocephalic  EYES: EOMI, PERRLA, conjunctiva and sclera clear  ENMT: No tonsillar erythema, exudates, or enlargement; Moist mucous membranes, Good dentition, No lesions  NECK: Supple, No JVD, Normal thyroid  NERVOUS SYSTEM:  Alert & Oriented X3, No focal deficit   CHEST/LUNG: Good air entry bilateral with no  rales, rhonchi, wheezing, or rubs  HEART: Regular rate and rhythm; No murmurs, rubs, or gallops  ABDOMEN: Soft, Nontender, Nondistended; Bowel sounds present  EXTREMITIES:  2+ Peripheral Pulses, No clubbing, cyanosis, or edema  SKIN: No rashes or lesions    Care Discussed with Consultants/Other Providers [ x] YES  [ ] NO

## 2017-07-21 NOTE — PROGRESS NOTE ADULT - ASSESSMENT
Assessment  DMT2: 52y Female with DM T2 with hyperglycemia with neuropathy blood sugars improving, non compliant with low carb diet.  HTN: uncontrolled on meds.  HLD:  on statin, tolerating.  Hyperthyroidism: on Tapazole 5mg po rachel, euthyroid, asymptomatic
Assessment  DMT2: 52y Female with DM T2 with hyperglycemia with neuropathy blood sugars improving, non compliant with low carb diet.  HTN: uncontrolled on meds.  HLD:  on statin, tolerating.  Hyperthyroidism: on Tapazole 5mg po rachel, euthyroid, asymptomatic
Assessment  DMT2: 52y Female with DM T2 with hyperglycemia with neuropathy nephropathy blood sugars high, non compliant with low carb diet.  HTN: uncontrolled on meds.  HLD:  on statin, tolerating.  Hyperthyroidism: on Tapazole 5mg po rachel, euthyroid, asymptomatic

## 2017-07-21 NOTE — PROGRESS NOTE ADULT - PROBLEM SELECTOR PROBLEM 4
Hypertension
Hypertension
Diabetes mellitus of other type with skin complication, without long-term current use of insulin
Hypertension
Hypertension
Left breast mass

## 2017-07-21 NOTE — CHART NOTE - NSCHARTNOTEFT_GEN_A_CORE
Request from Dr Clancy to facilitate patient discharge home today.  Medication reconciliation reviewed, revised, and resolved with Dr Clancy who has medically cleared patient for discharge and follow ups as advised for recommended ultrasound vs mammogram on CTA for left axilla lymph node .  Patient/family states understanding of discharge instruction and follow ups.  Please refer to discharge note for detailed hospital course.   Plan discussed with consults Dr Clancy  who agrees and patient will follow up with Dr Bangura  in one week.   Medicine NP MIKE Larson [phone] Request from Dr Clancy to facilitate patient discharge home today.  Medication reconciliation reviewed, revised, and resolved with Dr Clancy who has medically cleared patient for discharge and follow ups as advised for recommended ultrasound vs mammogram on CTA for left breast axilla lymph node .  Patient/family states understanding of discharge instruction and follow ups.  Please refer to discharge note for detailed hospital course.   Plan discussed with consults Dr Clancy  who agrees and patient will follow up with Dr Bangura  in one week.   Medicine NP MIKE Larson [phone]

## 2017-07-21 NOTE — PROGRESS NOTE ADULT - ATTENDING COMMENTS
Agree with above assessment and plan as outlined above.    - Ct scan noted, no need for further cardiac w/u    Herbert Cunningham MD, FACC  [facility], [facility]  [address] [place] [address]  PHONE:  [phone]  BEEPER : [phone]
Agree with above assessment and plan as outlined above.    -Echo wnl  - For cath today    Herbert Cunningham MD, FACC  [facility], [facility]  [address] [place] [address]  PHONE:  [phone]  BEEPER : [phone]
Patient seen and examined, agree with above assessment and plan as transcribed above.    - patent cors, normal LV, f/u CT chest    Herbert Cunningham MD, FACC  [facility], [facility]  [address] [place] [address]  PHONE:  [phone]  BEEPER : [phone]
DC planning.

## 2017-07-21 NOTE — PROGRESS NOTE ADULT - PROBLEM SELECTOR PROBLEM 3
Chest pain
Diabetes mellitus of other type with skin complication, without long-term current use of insulin
Hypertension
Hyperthyroidism

## 2017-07-21 NOTE — PROGRESS NOTE ADULT - PROBLEM SELECTOR PROBLEM 2
Hyperthyroidism
Hyperthyroidism
Chest pain, unspecified type
Diabetes
Hyperthyroidism
SOB (shortness of breath)

## 2017-07-21 NOTE — PROGRESS NOTE ADULT - SUBJECTIVE AND OBJECTIVE BOX
Subjective: pt seen and examined, No new compaints    sodium chloride 0.9% lock flush 3 milliLiter(s) IV Push every 12 hours  methimazole 5 milliGRAM(s) Oral daily  insulin lispro (HumaLOG) corrective regimen sliding scale   SubCutaneous three times a day before meals  insulin lispro (HumaLOG) corrective regimen sliding scale   SubCutaneous at bedtime  dextrose 5%. 1000 milliLiter(s) IV Continuous <Continuous>  dextrose Gel 1 Dose(s) Oral once PRN  dextrose 50% Injectable 12.5 Gram(s) IV Push once  dextrose 50% Injectable 25 Gram(s) IV Push once  dextrose 50% Injectable 25 Gram(s) IV Push once  glucagon  Injectable 1 milliGRAM(s) IntraMuscular once PRN  enoxaparin Injectable 40 milliGRAM(s) SubCutaneous daily  aspirin enteric coated 81 milliGRAM(s) Oral daily  atorvastatin 40 milliGRAM(s) Oral at bedtime  pregabalin 150 milliGRAM(s) Oral two times a day  escitalopram 20 milliGRAM(s) Oral daily  carvedilol 12.5 milliGRAM(s) Oral every 12 hours  docusate sodium 100 milliGRAM(s) Oral daily  pantoprazole    Tablet 40 milliGRAM(s) Oral before breakfast  insulin glargine Injectable (LANTUS) 12 Unit(s) SubCutaneous at bedtime  insulin lispro Injectable (HumaLOG) 7 Unit(s) SubCutaneous three times a day before meals  sodium chloride 0.9%. 1000 milliLiter(s) IV Continuous <Continuous>  valsartan 160 milliGRAM(s) Oral daily                            11.3   5.45  )-----------( 289      ( 2017 07:35 )             35.0       Hemoglobin: 11.3 g/dL ( @ 07:35)  Hemoglobin: 13.0 g/dL ( @ 14:55)          137  |  97  |  24<H>  ----------------------------<  184<H>  4.2   |  23  |  0.64    Ca    9.7      2017 07:40      Creatinine Trend: 0.64<--, 0.69<--    COAGS:     T(C): 36.6 (17 @ 20:57), Max: 37.5 (17 @ 09:48)  HR: 80 (17 @ 20:57) (80 - 93)  BP: 94/59 (17 @ 20:57) (92/56 - 107/67)  RR: 18 (17 @ 20:57) (18 - 18)  SpO2: 93% (17 @ 20:57) (93% - 96%)  Wt(kg): --    I&O's Summary    2017 07:01  -  2017 07:00  --------------------------------------------------------  IN: 820 mL / OUT: 1 mL / NET: 819 mL    2017 07:01  -  2017 05:17  --------------------------------------------------------  IN: 500 mL / OUT: 0 mL / NET: 500 mL        Daily     Daily Weight in k.8 (2017 11:07)    Appearance: Normal	  HEENT:   Normal oral mucosa, PERRL, EOMI	  Lymphatic: No lymphadenopathy , no edema  Cardiovascular: Normal S1 S2, No JVD, No murmurs , Peripheral pulses palpable 2+ bilaterally  Respiratory: Lungs clear to auscultation, normal effort 	  Gastrointestinal:  Soft, Non-tender, + BS	  Skin: No rashes, No ecchymoses, No cyanosis, warm to touch  Musculoskeletal: Normal range of motion, normal strength  Psychiatry:  Mood & affect appropriate    TELEMETRY:  NSR	    ECG:  	  RADIOLOGY:   DIAGNOSTIC TESTING:  [x ] Echocardiogram:  < from: TTE with Doppler (w/Cont) (17 @ 11:12) >  Conclusions:  Technically difficult study.  1. Normal mitral valve. Minimal mitral regurgitation.  2. Aortic valve not well visualized; appears trileaflet  with normal opening. No aortic valve regurgitation seen.  3. Endocardial visualization enhanced with intravenous  injection of echo contrast (Definity). Normal left  ventricular systolic function. No segmental wall motion  abnormalities.  4. The right ventricle is not well visualized; grossly  normal right ventricular systolic function.  *** No previous Echo exam.  ------------------------------------------------------------------------  Confirmed on  2017 - 08:59:56 by Tracey Oquendo M.D.  ------------------------------------------------------------------------    < end of copied text >     ]  Catheterization:  [ ] Stress Test:    OTHER: 	        ASSESSMENT/PLAN: 	52y Female DM,HTN, Chol, hyperthyroidism, CVA, Multiple sclerosis admitted with 2 weeks of SOB, CROW and atypical CP r/o for MI, s/p cath  with normal coronaries. ct chest pending ,     ASA, statin , coreg,. ARB,    echo noted  s/p cath with no nonobs CAD.  tele stable   GI / DVT prophylaxis.  keep K>4, mag >2.0   Endo follow up   ct chest pending , post IV hydration due to contrast from cardiac cath,   monitor creatine   D/W Dr Cunningham

## 2017-07-21 NOTE — PROGRESS NOTE ADULT - PROBLEM SELECTOR PLAN 4
ON CTA and needs Correlation with Mammogram ..explanied to pt as not done here so need PCP or GYn f/u with mammogram

## 2017-07-21 NOTE — PROGRESS NOTE ADULT - SUBJECTIVE AND OBJECTIVE BOX
Chief complaint  Patient is a 52y old  Female who presents with a chief complaint of ACS (19 Jul 2017 17:34)   Review of systems  Patient in bed, comfortable, no fever, no hypoglycemia.    Labs and Fingesticks    CAPILLARY BLOOD GLUCOSE  262 (21 Jul 2017 11:31)  187 (21 Jul 2017 07:12)  204 (20 Jul 2017 20:57)  158 (20 Jul 2017 18:35)  204 (20 Jul 2017 12:03)  273 (20 Jul 2017 07:43)  198 (19 Jul 2017 21:30)  228 (19 Jul 2017 17:45)  239 (19 Jul 2017 11:48)  205 (19 Jul 2017 08:38)  312 (18 Jul 2017 20:49)  191 (18 Jul 2017 16:36)    Anion Gap, Serum: 14 (07-21 @ 12:19)      Calcium, Total Serum: 8.9 (07-21 @ 12:19)          07-21    139  |  99  |  21  ----------------------------<  236<H>  4.3   |  26  |  0.62    Ca    8.9      21 Jul 2017 12:19      Medications  MEDICATIONS  (STANDING):  sodium chloride 0.9% lock flush 3 milliLiter(s) IV Push every 12 hours  methimazole 5 milliGRAM(s) Oral daily  insulin lispro (HumaLOG) corrective regimen sliding scale   SubCutaneous three times a day before meals  insulin lispro (HumaLOG) corrective regimen sliding scale   SubCutaneous at bedtime  dextrose 5%. 1000 milliLiter(s) (50 mL/Hr) IV Continuous <Continuous>  dextrose 50% Injectable 12.5 Gram(s) IV Push once  dextrose 50% Injectable 25 Gram(s) IV Push once  dextrose 50% Injectable 25 Gram(s) IV Push once  enoxaparin Injectable 40 milliGRAM(s) SubCutaneous daily  aspirin enteric coated 81 milliGRAM(s) Oral daily  atorvastatin 40 milliGRAM(s) Oral at bedtime  pregabalin 150 milliGRAM(s) Oral two times a day  escitalopram 20 milliGRAM(s) Oral daily  docusate sodium 100 milliGRAM(s) Oral daily  pantoprazole    Tablet 40 milliGRAM(s) Oral before breakfast  insulin glargine Injectable (LANTUS) 12 Unit(s) SubCutaneous at bedtime  insulin lispro Injectable (HumaLOG) 7 Unit(s) SubCutaneous three times a day before meals  sodium chloride 0.9%. 1000 milliLiter(s) (150 mL/Hr) IV Continuous <Continuous>  carvedilol 6.25 milliGRAM(s) Oral every 12 hours  valsartan 80 milliGRAM(s) Oral daily      Physical Exam  General: Patient comfortable in bed  Vital Signs Last 12 Hrs  T(F): 98.4 (07-21-17 @ 11:31), Max: 98.4 (07-21-17 @ 11:31)  HR: 84 (07-21-17 @ 11:31) (84 - 85)  BP: 104/68 (07-21-17 @ 11:31) (104/68 - 115/54)  BP(mean): --  RR: 17 (07-21-17 @ 11:31) (17 - 18)  SpO2: 96% (07-21-17 @ 11:31) (96% - 96%)  Neck: No palpable thyroid nodules.  CVS: S1S2, No murmurs  Respiratory: No wheezing, no crepitations  GI: Abdomen soft, bowel sounds positive  Musculoskeletal: Positive edema lower extremities bilaterally  Skin: No skin rashes, no ecchimosis    Diagnostics    Thyroid Stimulating Hormone, Serum: AM Sched. Collection: 19-Jul-2017 06:00 (07-18 @ 22:26)  Free Thyroxine, Serum: AM Sched. Collection: 19-Jul-2017 06:00 (07-18 @ 22:26)  Thyroperoxidase Antibody: AM Sched. Collection: 19-Jul-2017 06:00 (07-18 @ 22:26)  TSH Receptor Antibody: AM Sched. Collection: 19-Jul-2017 06:00 (07-18 @ 22:26)

## 2017-10-18 RX ORDER — FUROSEMIDE 40 MG
1 TABLET ORAL
Qty: 0 | Refills: 0 | COMMUNITY

## 2017-10-18 RX ORDER — METHIMAZOLE 10 MG/1
1 TABLET ORAL
Qty: 0 | Refills: 0 | COMMUNITY

## 2017-10-18 RX ORDER — LINAGLIPTIN AND METFORMIN HYDROCHLORIDE 2.5; 85 MG/1; MG/1
1 TABLET, FILM COATED ORAL
Qty: 0 | Refills: 0 | COMMUNITY

## 2017-10-18 RX ORDER — OMEPRAZOLE 10 MG/1
1 CAPSULE, DELAYED RELEASE ORAL
Qty: 0 | Refills: 0 | COMMUNITY

## 2017-10-18 RX ORDER — LIRAGLUTIDE 6 MG/ML
1.8 INJECTION SUBCUTANEOUS
Qty: 0 | Refills: 0 | COMMUNITY

## 2017-10-18 RX ORDER — CARVEDILOL PHOSPHATE 80 MG/1
1 CAPSULE, EXTENDED RELEASE ORAL
Qty: 0 | Refills: 0 | COMMUNITY

## 2017-10-18 RX ORDER — ESCITALOPRAM OXALATE 10 MG/1
1 TABLET, FILM COATED ORAL
Qty: 0 | Refills: 0 | COMMUNITY

## 2017-10-18 RX ORDER — ASPIRIN/CALCIUM CARB/MAGNESIUM 324 MG
1 TABLET ORAL
Qty: 0 | Refills: 0 | COMMUNITY

## 2017-10-18 RX ORDER — FENOFIBRATE,MICRONIZED 130 MG
1 CAPSULE ORAL
Qty: 0 | Refills: 0 | COMMUNITY

## 2018-08-17 ENCOUNTER — APPOINTMENT (OUTPATIENT)
Dept: OBGYN | Facility: CLINIC | Age: 53
End: 2018-08-17

## 2018-08-17 ENCOUNTER — APPOINTMENT (OUTPATIENT)
Dept: OBGYN | Facility: CLINIC | Age: 53
End: 2018-08-17
Payer: MEDICAID

## 2018-08-17 VITALS
DIASTOLIC BLOOD PRESSURE: 74 MMHG | SYSTOLIC BLOOD PRESSURE: 126 MMHG | OXYGEN SATURATION: 95 % | WEIGHT: 200 LBS | BODY MASS INDEX: 39.27 KG/M2 | HEIGHT: 60 IN | HEART RATE: 98 BPM

## 2018-08-17 DIAGNOSIS — N39.3 STRESS INCONTINENCE (FEMALE) (MALE): ICD-10-CM

## 2018-08-17 DIAGNOSIS — N95.2 POSTMENOPAUSAL ATROPHIC VAGINITIS: ICD-10-CM

## 2018-08-17 DIAGNOSIS — Z78.0 ASYMPTOMATIC MENOPAUSAL STATE: ICD-10-CM

## 2018-08-17 DIAGNOSIS — Z11.3 ENCOUNTER FOR SCREENING FOR INFECTIONS WITH A PREDOMINANTLY SEXUAL MODE OF TRANSMISSION: ICD-10-CM

## 2018-08-17 DIAGNOSIS — Z01.419 ENCOUNTER FOR GYNECOLOGICAL EXAMINATION (GENERAL) (ROUTINE) W/OUT ABNORMAL FINDINGS: ICD-10-CM

## 2018-08-17 DIAGNOSIS — Z12.31 ENCOUNTER FOR SCREENING MAMMOGRAM FOR MALIGNANT NEOPLASM OF BREAST: ICD-10-CM

## 2018-08-17 PROCEDURE — 99386 PREV VISIT NEW AGE 40-64: CPT

## 2018-08-24 ENCOUNTER — RESULT REVIEW (OUTPATIENT)
Age: 53
End: 2018-08-24

## 2018-08-24 LAB
CANDIDA VAG CYTO: DETECTED
CYTOLOGY CVX/VAG DOC THIN PREP: NORMAL
G VAGINALIS+PREV SP MTYP VAG QL MICRO: NOT DETECTED
HPV HIGH+LOW RISK DNA PNL CVX: NOT DETECTED
T VAGINALIS VAG QL WET PREP: NOT DETECTED

## 2018-08-24 RX ORDER — TERCONAZOLE 8 MG/G
0.8 CREAM VAGINAL
Qty: 1 | Refills: 2 | Status: ACTIVE | COMMUNITY
Start: 2018-08-24 | End: 1900-01-01

## 2019-04-10 ENCOUNTER — EMERGENCY (EMERGENCY)
Facility: HOSPITAL | Age: 54
LOS: 1 days | Discharge: HOME | End: 2019-04-10
Attending: EMERGENCY MEDICINE
Payer: COMMERCIAL

## 2019-04-10 VITALS
DIASTOLIC BLOOD PRESSURE: 79 MMHG | HEIGHT: 58 IN | HEART RATE: 109 BPM | SYSTOLIC BLOOD PRESSURE: 125 MMHG | TEMPERATURE: 98 F | WEIGHT: 274.92 LBS | OXYGEN SATURATION: 93 % | RESPIRATION RATE: 18 BRPM

## 2019-04-10 VITALS
HEART RATE: 98 BPM | OXYGEN SATURATION: 98 % | SYSTOLIC BLOOD PRESSURE: 126 MMHG | RESPIRATION RATE: 20 BRPM | DIASTOLIC BLOOD PRESSURE: 91 MMHG

## 2019-04-10 DIAGNOSIS — Z98.89 OTHER SPECIFIED POSTPROCEDURAL STATES: Chronic | ICD-10-CM

## 2019-04-10 DIAGNOSIS — K46.9 UNSPECIFIED ABDOMINAL HERNIA WITHOUT OBSTRUCTION OR GANGRENE: Chronic | ICD-10-CM

## 2019-04-10 PROCEDURE — 99284 EMERGENCY DEPT VISIT MOD MDM: CPT | Mod: 25

## 2019-04-10 PROCEDURE — 70450 CT HEAD/BRAIN W/O DYE: CPT

## 2019-04-10 PROCEDURE — 73030 X-RAY EXAM OF SHOULDER: CPT

## 2019-04-10 PROCEDURE — 73030 X-RAY EXAM OF SHOULDER: CPT | Mod: 26,RT

## 2019-04-10 PROCEDURE — 72125 CT NECK SPINE W/O DYE: CPT | Mod: 26

## 2019-04-10 PROCEDURE — 29125 APPL SHORT ARM SPLINT STATIC: CPT

## 2019-04-10 PROCEDURE — 72125 CT NECK SPINE W/O DYE: CPT

## 2019-04-10 PROCEDURE — 29125 APPL SHORT ARM SPLINT STATIC: CPT | Mod: RT

## 2019-04-10 PROCEDURE — 73120 X-RAY EXAM OF HAND: CPT

## 2019-04-10 PROCEDURE — 70450 CT HEAD/BRAIN W/O DYE: CPT | Mod: 26

## 2019-04-10 PROCEDURE — 73120 X-RAY EXAM OF HAND: CPT | Mod: 26,RT

## 2019-04-10 RX ORDER — ACETAMINOPHEN 500 MG
975 TABLET ORAL ONCE
Qty: 0 | Refills: 0 | Status: COMPLETED | OUTPATIENT
Start: 2019-04-10 | End: 2019-04-10

## 2019-04-10 RX ORDER — IBUPROFEN 200 MG
600 TABLET ORAL ONCE
Qty: 0 | Refills: 0 | Status: DISCONTINUED | OUTPATIENT
Start: 2019-04-10 | End: 2019-04-14

## 2019-04-10 RX ADMIN — Medication 975 MILLIGRAM(S): at 19:54

## 2019-04-10 RX ADMIN — Medication 1 MILLIGRAM(S): at 19:53
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
## 2019-04-10 NOTE — ED PROVIDER NOTE - CARE PLAN
Principal Discharge DX:	Metacarpal bone fracture Principal Discharge DX:	Metacarpal bone fracture  Goal:	base first

## 2019-04-10 NOTE — ED PROVIDER NOTE - MUSCULOSKELETAL, MLM
Spine appears normal, range of motion is not limited, no muscle or joint tenderness 5/5 strength in distal extremities b/l. intrinsic muscles of hand intact
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
## 2019-04-10 NOTE — ED PROVIDER NOTE - NSFOLLOWUPINSTRUCTIONS_ED_ALL_ED_FT
Please return to the ER if you develop worsening symptoms, numbness/tingling, weakness, or have any other concerns. Please follow up with orthopedic surgery for further evaluation/treatment.
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
## 2019-04-10 NOTE — ED PROVIDER NOTE - CLINICAL SUMMARY MEDICAL DECISION MAKING FREE TEXT BOX
53F well appearing. UTD with tetanus. Trauma evaluation. Imaging, analgese, reassess, likely d/c home with return precautions and f/u
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
## 2019-04-10 NOTE — ED PROVIDER NOTE - NSFOLLOWUPCLINICS_GEN_ALL_ED_FT
[facility] Orthopedic [facility]  Orthopedic Surgery  [address], [address] & [address]th [place] [address]  Phone: [phone]  Fax:   Follow Up Time:

## 2019-04-10 NOTE — ED ADULT NURSE REASSESSMENT NOTE - NS ED NURSE REASSESS COMMENT FT1
Patient right wrist splinted by MD cruz. Patient states she is feeling better and is ready for discharge. Patient educated regarding orthopedic follow up by RN

## 2019-04-10 NOTE — ED PROVIDER NOTE - NEUROLOGICAL, MLM
Alert and oriented, no focal deficits, no motor or sensory deficits. cn2-12 grossly intact, normal speech and tone. normal gait

## 2019-04-11 NOTE — ED POST DISCHARGE NOTE - RESULT SUMMARY
Subtle lucency in the distal aspect of the thumb proximal phalanx, question projectional artifact versus nondisplaced fracture. Subtle lucency and  cortical irregularity in the base of the thumb metacarpal is equivocal for projectional artifact versus subtle nondisplaced fracture.

## 2019-04-11 NOTE — ED POST DISCHARGE NOTE - OTHER COMMUNICATION
Reviewed ED chart pt thumb was ttp. Patient w/ placed in thumb spica using orthoglass on right side for concern of fracture and d/c with ortho follow up. Proper care in ED, no need to contact patient. Gail Scherer PA-C

## 2019-04-20 ENCOUNTER — EMERGENCY (EMERGENCY)
Facility: HOSPITAL | Age: 54
LOS: 1 days | Discharge: HOME | End: 2019-04-20
Attending: EMERGENCY MEDICINE
Payer: COMMERCIAL

## 2019-04-20 VITALS
RESPIRATION RATE: 18 BRPM | HEART RATE: 89 BPM | SYSTOLIC BLOOD PRESSURE: 105 MMHG | TEMPERATURE: 98 F | OXYGEN SATURATION: 99 % | DIASTOLIC BLOOD PRESSURE: 70 MMHG

## 2019-04-20 VITALS
SYSTOLIC BLOOD PRESSURE: 111 MMHG | HEIGHT: 60 IN | OXYGEN SATURATION: 95 % | RESPIRATION RATE: 20 BRPM | WEIGHT: 214.95 LBS | HEART RATE: 101 BPM | DIASTOLIC BLOOD PRESSURE: 74 MMHG | TEMPERATURE: 98 F

## 2019-04-20 DIAGNOSIS — Z98.89 OTHER SPECIFIED POSTPROCEDURAL STATES: Chronic | ICD-10-CM

## 2019-04-20 DIAGNOSIS — K46.9 UNSPECIFIED ABDOMINAL HERNIA WITHOUT OBSTRUCTION OR GANGRENE: Chronic | ICD-10-CM

## 2019-04-20 PROCEDURE — 73130 X-RAY EXAM OF HAND: CPT | Mod: 26,RT

## 2019-04-20 PROCEDURE — 29125 APPL SHORT ARM SPLINT STATIC: CPT

## 2019-04-20 PROCEDURE — 99284 EMERGENCY DEPT VISIT MOD MDM: CPT | Mod: 25

## 2019-04-20 PROCEDURE — 73110 X-RAY EXAM OF WRIST: CPT

## 2019-04-20 PROCEDURE — 99283 EMERGENCY DEPT VISIT LOW MDM: CPT | Mod: 25

## 2019-04-20 PROCEDURE — 73130 X-RAY EXAM OF HAND: CPT

## 2019-04-20 PROCEDURE — 29125 APPL SHORT ARM SPLINT STATIC: CPT | Mod: RT

## 2019-04-20 PROCEDURE — 73110 X-RAY EXAM OF WRIST: CPT | Mod: 26,RT

## 2019-04-20 RX ORDER — ACETAMINOPHEN 500 MG
975 TABLET ORAL ONCE
Qty: 0 | Refills: 0 | Status: COMPLETED | OUTPATIENT
Start: 2019-04-20 | End: 2019-04-20

## 2019-04-20 RX ORDER — IBUPROFEN 200 MG
400 TABLET ORAL ONCE
Qty: 0 | Refills: 0 | Status: COMPLETED | OUTPATIENT
Start: 2019-04-20 | End: 2019-04-20

## 2019-04-20 RX ADMIN — Medication 400 MILLIGRAM(S): at 12:40

## 2019-04-20 RX ADMIN — Medication 400 MILLIGRAM(S): at 13:08

## 2019-04-20 RX ADMIN — Medication 975 MILLIGRAM(S): at 13:08

## 2019-04-20 RX ADMIN — Medication 975 MILLIGRAM(S): at 12:39

## 2019-04-20 NOTE — ED PROVIDER NOTE - NS ED ROS FT
CONSTITUTIONAL: No fevers, no chills  Eyes: no visual changes  Mouth/Throat: no sore throat  Cardiovascular: No Chest pain  Respiratory: No SOB  Gastrointestinal: No n/v/d, no abd pain  Genitourinary: no dysuria, no hematuria  SKIN: no rashes.  NEURO: no headache, no new weakness or numbness
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
## 2019-04-20 NOTE — ED ADULT NURSE NOTE - OBJECTIVE STATEMENT
52 yo f pmhx hypothyroid, HTN, HLD, diabetes neuropathy presents to the ED with c/o right thumb pain post cast and splinting. Pt states she has right thumb pain 6/10, describes the pain as a dull ache, hurts more when moving fingers. Pt is able to move all fingers and has + sensation, warmth felt to the fingers. Pt denies numbness and tingling to the hands and fingers. Family at bedside.

## 2019-04-20 NOTE — ED PROVIDER NOTE - CLINICAL SUMMARY MEDICAL DECISION MAKING FREE TEXT BOX
53F hx htn, hld, dm, MS, hyperthyroid, stroke on ASA with headache pw cc of wrist pain. Snuffbox tenderness, will do rpt xray, splint and arrange closer F/U with orthopedics. Dr. Londono (Attending Physician)  53F hx htn, hld, dm, MS, hyperthyroid, stroke on ASA with headache pw cc of wrist pain unable to get follow-up with ortho. Snuffbox tenderness, will do rpt xray, splint and arrange closer F/U with orthopedics.

## 2019-04-20 NOTE — ED ADULT NURSE NOTE - CHIEF COMPLAINT QUOTE
"I need to have my thumb re evaluated"  unable to follow up with ortho outpatient- c/o throbbing pain to right thumb on 4/10

## 2019-04-20 NOTE — ED PROCEDURE NOTE - ATTENDING CONTRIBUTION TO CARE
Dr. Londono (Attending Physician)  I supervised the above procedure and agree with the documented note.

## 2019-04-20 NOTE — ED PROVIDER NOTE - NSFOLLOWUPINSTRUCTIONS_ED_ALL_ED_FT
(1) Follow up with Dr. Amin at [phone] or [phone] and explained you were seen in the emergency room a second time and need to be seen within a week.   (2) Immediately seek care at your nearest emergency room if your worsen, persist, or do not resolve   (3) Take Tylenol and Ibuprofun staggered up to every 6 hours as needed for pain  (4)

## 2019-04-20 NOTE — ED PROVIDER NOTE - PROGRESS NOTE DETAILS
romie pgy1: Pt splinted, spoke w/ Orthopedic resident Cristopher who stated that Dr. Amin @ [phone] or [phone] can see pt within a week. D/W pt, pt comfortable with doing this. Will give thumb spica splint due to snuffbox tenderness

## 2019-04-20 NOTE — ED PROVIDER NOTE - PHYSICAL EXAMINATION
General: well nourished, NAD  CV: regular rhythm, normal S1 and S2 with no murmur  Resp: lungs clear to auscultation bilaterally, symmetric chest wall rise  Abd: soft, nontender, nondistended, normoactive bowel sounds  : no CVA tenderness  MSK: Tender to R lateral thumb and over snuffbox, tender over 1st proximal MCP  Neuro: Moving all extremities  Skin: no rashes, skin intact

## 2019-04-20 NOTE — ED PROVIDER NOTE - ATTENDING CONTRIBUTION TO CARE
Dr. Londono (Attending Physician)  I performed a history and physical exam of the patient and discussed their management with the resident. I reviewed the resident's note and agree with the documented findings and plan of care. My medical decision making and observations are found above.

## 2019-04-23 ENCOUNTER — APPOINTMENT (OUTPATIENT)
Dept: ORTHOPEDIC SURGERY | Facility: CLINIC | Age: 54
End: 2019-04-23
Payer: COMMERCIAL

## 2019-04-23 VITALS
HEART RATE: 92 BPM | DIASTOLIC BLOOD PRESSURE: 70 MMHG | HEIGHT: 60 IN | BODY MASS INDEX: 42.6 KG/M2 | WEIGHT: 217 LBS | SYSTOLIC BLOOD PRESSURE: 117 MMHG

## 2019-04-23 DIAGNOSIS — M19.049 PRIMARY OSTEOARTHRITIS, UNSPECIFIED HAND: ICD-10-CM

## 2019-04-23 DIAGNOSIS — S60.221D CONTUSION OF RIGHT HAND, SUBSEQUENT ENCOUNTER: ICD-10-CM

## 2019-04-23 PROCEDURE — 99203 OFFICE O/P NEW LOW 30 MIN: CPT

## 2019-08-22 NOTE — DISCHARGE NOTE ADULT - MEDICATION SUMMARY - MEDICATIONS TO CHANGE
Detail Level: Simple I will SWITCH the dose or number of times a day I take the medications listed below when I get home from the hospital:  None

## 2019-11-07 ENCOUNTER — OUTPATIENT (OUTPATIENT)
Dept: OUTPATIENT SERVICES | Facility: HOSPITAL | Age: 54
LOS: 1 days | Discharge: HOME | End: 2019-11-07
Payer: MEDICARE

## 2019-11-07 ENCOUNTER — RESULT REVIEW (OUTPATIENT)
Age: 54
End: 2019-11-07

## 2019-11-07 VITALS
HEART RATE: 82 BPM | RESPIRATION RATE: 18 BRPM | OXYGEN SATURATION: 97 % | DIASTOLIC BLOOD PRESSURE: 61 MMHG | SYSTOLIC BLOOD PRESSURE: 103 MMHG | WEIGHT: 207.01 LBS | TEMPERATURE: 97 F

## 2019-11-07 VITALS — DIASTOLIC BLOOD PRESSURE: 66 MMHG | SYSTOLIC BLOOD PRESSURE: 116 MMHG

## 2019-11-07 DIAGNOSIS — K46.9 UNSPECIFIED ABDOMINAL HERNIA WITHOUT OBSTRUCTION OR GANGRENE: Chronic | ICD-10-CM

## 2019-11-07 DIAGNOSIS — R10.13 EPIGASTRIC PAIN: ICD-10-CM

## 2019-11-07 DIAGNOSIS — Z98.89 OTHER SPECIFIED POSTPROCEDURAL STATES: Chronic | ICD-10-CM

## 2019-11-07 LAB — GLUCOSE BLDC GLUCOMTR-MCNC: 197 MG/DL — HIGH (ref 70–99)

## 2019-11-07 PROCEDURE — 88305 TISSUE EXAM BY PATHOLOGIST: CPT | Mod: 26

## 2019-11-07 PROCEDURE — 88312 SPECIAL STAINS GROUP 1: CPT | Mod: 26

## 2019-11-07 RX ORDER — SODIUM CHLORIDE 9 MG/ML
1000 INJECTION, SOLUTION INTRAVENOUS
Refills: 0 | Status: DISCONTINUED | OUTPATIENT
Start: 2019-11-07 | End: 2019-11-22

## 2019-11-07 RX ADMIN — SODIUM CHLORIDE 30 MILLILITER(S): 9 INJECTION, SOLUTION INTRAVENOUS at 08:40

## 2019-11-07 NOTE — ASU PATIENT PROFILE, ADULT - PMH
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

## 2019-11-14 LAB — SURGICAL PATHOLOGY STUDY: SIGNIFICANT CHANGE UP

## 2020-01-21 ENCOUNTER — INPATIENT (INPATIENT)
Facility: HOSPITAL | Age: 55
LOS: 0 days | Discharge: HOME | DRG: 313 | End: 2020-01-22
Attending: INTERNAL MEDICINE | Admitting: INTERNAL MEDICINE
Payer: MEDICARE

## 2020-01-21 VITALS
HEIGHT: 60 IN | OXYGEN SATURATION: 96 % | HEART RATE: 97 BPM | TEMPERATURE: 98 F | SYSTOLIC BLOOD PRESSURE: 131 MMHG | RESPIRATION RATE: 16 BRPM | DIASTOLIC BLOOD PRESSURE: 68 MMHG | WEIGHT: 216.93 LBS

## 2020-01-21 DIAGNOSIS — I10 ESSENTIAL (PRIMARY) HYPERTENSION: ICD-10-CM

## 2020-01-21 DIAGNOSIS — E11.9 TYPE 2 DIABETES MELLITUS WITHOUT COMPLICATIONS: ICD-10-CM

## 2020-01-21 DIAGNOSIS — E05.90 THYROTOXICOSIS, UNSPECIFIED WITHOUT THYROTOXIC CRISIS OR STORM: ICD-10-CM

## 2020-01-21 DIAGNOSIS — E11.40 TYPE 2 DIABETES MELLITUS WITH DIABETIC NEUROPATHY, UNSPECIFIED: ICD-10-CM

## 2020-01-21 DIAGNOSIS — I63.9 CEREBRAL INFARCTION, UNSPECIFIED: ICD-10-CM

## 2020-01-21 DIAGNOSIS — K46.9 UNSPECIFIED ABDOMINAL HERNIA WITHOUT OBSTRUCTION OR GANGRENE: Chronic | ICD-10-CM

## 2020-01-21 DIAGNOSIS — Z98.89 OTHER SPECIFIED POSTPROCEDURAL STATES: Chronic | ICD-10-CM

## 2020-01-21 DIAGNOSIS — R07.9 CHEST PAIN, UNSPECIFIED: ICD-10-CM

## 2020-01-21 PROBLEM — G35 MULTIPLE SCLEROSIS: Chronic | Status: ACTIVE | Noted: 2019-11-07

## 2020-01-21 LAB
ALBUMIN SERPL ELPH-MCNC: 4.1 G/DL — SIGNIFICANT CHANGE UP (ref 3.3–5)
ALP SERPL-CCNC: 74 U/L — SIGNIFICANT CHANGE UP (ref 40–120)
ALT FLD-CCNC: 35 U/L — SIGNIFICANT CHANGE UP (ref 10–45)
ANION GAP SERPL CALC-SCNC: 17 MMOL/L — SIGNIFICANT CHANGE UP (ref 5–17)
APPEARANCE UR: CLEAR — SIGNIFICANT CHANGE UP
AST SERPL-CCNC: 21 U/L — SIGNIFICANT CHANGE UP (ref 10–40)
BASE EXCESS BLDV CALC-SCNC: 3.5 MMOL/L — HIGH (ref -2–2)
BASOPHILS # BLD AUTO: 0.02 K/UL — SIGNIFICANT CHANGE UP (ref 0–0.2)
BASOPHILS NFR BLD AUTO: 0.3 % — SIGNIFICANT CHANGE UP (ref 0–2)
BILIRUB SERPL-MCNC: 0.2 MG/DL — SIGNIFICANT CHANGE UP (ref 0.2–1.2)
BILIRUB UR-MCNC: NEGATIVE — SIGNIFICANT CHANGE UP
BUN SERPL-MCNC: 18 MG/DL — SIGNIFICANT CHANGE UP (ref 7–23)
CA-I SERPL-SCNC: 1.09 MMOL/L — LOW (ref 1.12–1.3)
CALCIUM SERPL-MCNC: 9.9 MG/DL — SIGNIFICANT CHANGE UP (ref 8.4–10.5)
CHLORIDE BLDV-SCNC: 105 MMOL/L — SIGNIFICANT CHANGE UP (ref 96–108)
CHLORIDE SERPL-SCNC: 99 MMOL/L — SIGNIFICANT CHANGE UP (ref 96–108)
CO2 BLDV-SCNC: 30 MMOL/L — SIGNIFICANT CHANGE UP (ref 22–30)
CO2 SERPL-SCNC: 21 MMOL/L — LOW (ref 22–31)
COLOR SPEC: SIGNIFICANT CHANGE UP
CREAT SERPL-MCNC: 0.61 MG/DL — SIGNIFICANT CHANGE UP (ref 0.5–1.3)
DIFF PNL FLD: NEGATIVE — SIGNIFICANT CHANGE UP
EOSINOPHIL # BLD AUTO: 0.03 K/UL — SIGNIFICANT CHANGE UP (ref 0–0.5)
EOSINOPHIL NFR BLD AUTO: 0.5 % — SIGNIFICANT CHANGE UP (ref 0–6)
GAS PNL BLDV: 136 MMOL/L — SIGNIFICANT CHANGE UP (ref 135–145)
GAS PNL BLDV: SIGNIFICANT CHANGE UP
GLUCOSE BLDC GLUCOMTR-MCNC: 137 MG/DL — HIGH (ref 70–99)
GLUCOSE BLDC GLUCOMTR-MCNC: 227 MG/DL — HIGH (ref 70–99)
GLUCOSE BLDC GLUCOMTR-MCNC: 265 MG/DL — HIGH (ref 70–99)
GLUCOSE BLDV-MCNC: 221 MG/DL — HIGH (ref 70–99)
GLUCOSE SERPL-MCNC: 188 MG/DL — HIGH (ref 70–99)
GLUCOSE UR QL: ABNORMAL
HCO3 BLDV-SCNC: 29 MMOL/L — SIGNIFICANT CHANGE UP (ref 21–29)
HCT VFR BLD CALC: 36.4 % — SIGNIFICANT CHANGE UP (ref 34.5–45)
HCT VFR BLDA CALC: 38 % — LOW (ref 39–50)
HGB BLD CALC-MCNC: 12.3 G/DL — SIGNIFICANT CHANGE UP (ref 11.5–15.5)
HGB BLD-MCNC: 11.7 G/DL — SIGNIFICANT CHANGE UP (ref 11.5–15.5)
IMM GRANULOCYTES NFR BLD AUTO: 0.5 % — SIGNIFICANT CHANGE UP (ref 0–1.5)
KETONES UR-MCNC: NEGATIVE — SIGNIFICANT CHANGE UP
LACTATE BLDV-MCNC: 2.2 MMOL/L — HIGH (ref 0.7–2)
LEUKOCYTE ESTERASE UR-ACNC: NEGATIVE — SIGNIFICANT CHANGE UP
LIDOCAIN IGE QN: 41 U/L — SIGNIFICANT CHANGE UP (ref 7–60)
LYMPHOCYTES # BLD AUTO: 1.37 K/UL — SIGNIFICANT CHANGE UP (ref 1–3.3)
LYMPHOCYTES # BLD AUTO: 22.1 % — SIGNIFICANT CHANGE UP (ref 13–44)
MCHC RBC-ENTMCNC: 28.2 PG — SIGNIFICANT CHANGE UP (ref 27–34)
MCHC RBC-ENTMCNC: 32.1 GM/DL — SIGNIFICANT CHANGE UP (ref 32–36)
MCV RBC AUTO: 87.7 FL — SIGNIFICANT CHANGE UP (ref 80–100)
MONOCYTES # BLD AUTO: 0.74 K/UL — SIGNIFICANT CHANGE UP (ref 0–0.9)
MONOCYTES NFR BLD AUTO: 11.9 % — SIGNIFICANT CHANGE UP (ref 2–14)
NEUTROPHILS # BLD AUTO: 4.02 K/UL — SIGNIFICANT CHANGE UP (ref 1.8–7.4)
NEUTROPHILS NFR BLD AUTO: 64.7 % — SIGNIFICANT CHANGE UP (ref 43–77)
NITRITE UR-MCNC: NEGATIVE — SIGNIFICANT CHANGE UP
NRBC # BLD: 0 /100 WBCS — SIGNIFICANT CHANGE UP (ref 0–0)
PCO2 BLDV: 49 MMHG — SIGNIFICANT CHANGE UP (ref 35–50)
PH BLDV: 7.39 — SIGNIFICANT CHANGE UP (ref 7.35–7.45)
PH UR: 6 — SIGNIFICANT CHANGE UP (ref 5–8)
PLATELET # BLD AUTO: 254 K/UL — SIGNIFICANT CHANGE UP (ref 150–400)
PO2 BLDV: 48 MMHG — HIGH (ref 25–45)
POTASSIUM BLDV-SCNC: 3.7 MMOL/L — SIGNIFICANT CHANGE UP (ref 3.5–5.3)
POTASSIUM SERPL-MCNC: 3.9 MMOL/L — SIGNIFICANT CHANGE UP (ref 3.5–5.3)
POTASSIUM SERPL-SCNC: 3.9 MMOL/L — SIGNIFICANT CHANGE UP (ref 3.5–5.3)
PROT SERPL-MCNC: 7 G/DL — SIGNIFICANT CHANGE UP (ref 6–8.3)
PROT UR-MCNC: NEGATIVE — SIGNIFICANT CHANGE UP
RBC # BLD: 4.15 M/UL — SIGNIFICANT CHANGE UP (ref 3.8–5.2)
RBC # FLD: 14.2 % — SIGNIFICANT CHANGE UP (ref 10.3–14.5)
SAO2 % BLDV: 79 % — SIGNIFICANT CHANGE UP (ref 67–88)
SODIUM SERPL-SCNC: 137 MMOL/L — SIGNIFICANT CHANGE UP (ref 135–145)
SP GR SPEC: 1.02 — SIGNIFICANT CHANGE UP (ref 1.01–1.02)
TROPONIN T, HIGH SENSITIVITY RESULT: <6 NG/L — SIGNIFICANT CHANGE UP (ref 0–51)
TROPONIN T, HIGH SENSITIVITY RESULT: <6 NG/L — SIGNIFICANT CHANGE UP (ref 0–51)
UROBILINOGEN FLD QL: ABNORMAL
WBC # BLD: 6.21 K/UL — SIGNIFICANT CHANGE UP (ref 3.8–10.5)
WBC # FLD AUTO: 6.21 K/UL — SIGNIFICANT CHANGE UP (ref 3.8–10.5)

## 2020-01-21 PROCEDURE — 74177 CT ABD & PELVIS W/CONTRAST: CPT | Mod: 26

## 2020-01-21 PROCEDURE — 93010 ELECTROCARDIOGRAM REPORT: CPT | Mod: 76

## 2020-01-21 PROCEDURE — 76705 ECHO EXAM OF ABDOMEN: CPT | Mod: 26,RT

## 2020-01-21 PROCEDURE — 71046 X-RAY EXAM CHEST 2 VIEWS: CPT | Mod: 26

## 2020-01-21 PROCEDURE — 99285 EMERGENCY DEPT VISIT HI MDM: CPT

## 2020-01-21 RX ORDER — FAMOTIDINE 10 MG/ML
20 INJECTION INTRAVENOUS ONCE
Refills: 0 | Status: COMPLETED | OUTPATIENT
Start: 2020-01-21 | End: 2020-01-21

## 2020-01-21 RX ORDER — INSULIN LISPRO 100/ML
VIAL (ML) SUBCUTANEOUS AT BEDTIME
Refills: 0 | Status: DISCONTINUED | OUTPATIENT
Start: 2020-01-21 | End: 2020-01-22

## 2020-01-21 RX ORDER — DEXTROSE 50 % IN WATER 50 %
15 SYRINGE (ML) INTRAVENOUS ONCE
Refills: 0 | Status: DISCONTINUED | OUTPATIENT
Start: 2020-01-21 | End: 2020-01-22

## 2020-01-21 RX ORDER — METFORMIN HYDROCHLORIDE 850 MG/1
1 TABLET ORAL
Qty: 0 | Refills: 0 | DISCHARGE

## 2020-01-21 RX ORDER — INSULIN LISPRO 100/ML
VIAL (ML) SUBCUTANEOUS
Refills: 0 | Status: DISCONTINUED | OUTPATIENT
Start: 2020-01-21 | End: 2020-01-22

## 2020-01-21 RX ORDER — SODIUM CHLORIDE 9 MG/ML
1000 INJECTION, SOLUTION INTRAVENOUS
Refills: 0 | Status: DISCONTINUED | OUTPATIENT
Start: 2020-01-21 | End: 2020-01-22

## 2020-01-21 RX ORDER — ASPIRIN/CALCIUM CARB/MAGNESIUM 324 MG
81 TABLET ORAL DAILY
Refills: 0 | Status: DISCONTINUED | OUTPATIENT
Start: 2020-01-21 | End: 2020-01-22

## 2020-01-21 RX ORDER — CARVEDILOL PHOSPHATE 80 MG/1
6.25 CAPSULE, EXTENDED RELEASE ORAL EVERY 12 HOURS
Refills: 0 | Status: DISCONTINUED | OUTPATIENT
Start: 2020-01-21 | End: 2020-01-21

## 2020-01-21 RX ORDER — ERGOCALCIFEROL 1.25 MG/1
1 CAPSULE ORAL
Qty: 0 | Refills: 0 | DISCHARGE

## 2020-01-21 RX ORDER — DEXTROSE 50 % IN WATER 50 %
12.5 SYRINGE (ML) INTRAVENOUS ONCE
Refills: 0 | Status: DISCONTINUED | OUTPATIENT
Start: 2020-01-21 | End: 2020-01-22

## 2020-01-21 RX ORDER — GLUCAGON INJECTION, SOLUTION 0.5 MG/.1ML
1 INJECTION, SOLUTION SUBCUTANEOUS ONCE
Refills: 0 | Status: DISCONTINUED | OUTPATIENT
Start: 2020-01-21 | End: 2020-01-22

## 2020-01-21 RX ORDER — DEXTROSE 50 % IN WATER 50 %
25 SYRINGE (ML) INTRAVENOUS ONCE
Refills: 0 | Status: DISCONTINUED | OUTPATIENT
Start: 2020-01-21 | End: 2020-01-22

## 2020-01-21 RX ORDER — FENOFIBRATE,MICRONIZED 130 MG
145 CAPSULE ORAL DAILY
Refills: 0 | Status: DISCONTINUED | OUTPATIENT
Start: 2020-01-21 | End: 2020-01-22

## 2020-01-21 RX ORDER — VALSARTAN 80 MG/1
1 TABLET ORAL
Qty: 0 | Refills: 0 | DISCHARGE

## 2020-01-21 RX ORDER — DOCUSATE SODIUM 100 MG
1 CAPSULE ORAL
Qty: 0 | Refills: 0 | DISCHARGE

## 2020-01-21 RX ORDER — SODIUM CHLORIDE 9 MG/ML
1000 INJECTION INTRAMUSCULAR; INTRAVENOUS; SUBCUTANEOUS ONCE
Refills: 0 | Status: COMPLETED | OUTPATIENT
Start: 2020-01-21 | End: 2020-01-21

## 2020-01-21 RX ORDER — ERGOCALCIFEROL 1.25 MG/1
50000 CAPSULE ORAL
Refills: 0 | Status: DISCONTINUED | OUTPATIENT
Start: 2020-01-21 | End: 2020-01-22

## 2020-01-21 RX ORDER — PANTOPRAZOLE SODIUM 20 MG/1
40 TABLET, DELAYED RELEASE ORAL
Refills: 0 | Status: DISCONTINUED | OUTPATIENT
Start: 2020-01-21 | End: 2020-01-22

## 2020-01-21 RX ORDER — OMEPRAZOLE 10 MG/1
1 CAPSULE, DELAYED RELEASE ORAL
Qty: 30 | Refills: 0

## 2020-01-21 RX ORDER — METHIMAZOLE 10 MG/1
1 TABLET ORAL
Qty: 0 | Refills: 0 | DISCHARGE

## 2020-01-21 RX ORDER — ONDANSETRON 8 MG/1
4 TABLET, FILM COATED ORAL ONCE
Refills: 0 | Status: COMPLETED | OUTPATIENT
Start: 2020-01-21 | End: 2020-01-21

## 2020-01-21 RX ORDER — CARVEDILOL PHOSPHATE 80 MG/1
6.25 CAPSULE, EXTENDED RELEASE ORAL EVERY 12 HOURS
Refills: 0 | Status: DISCONTINUED | OUTPATIENT
Start: 2020-01-21 | End: 2020-01-22

## 2020-01-21 RX ORDER — TRAZODONE HCL 50 MG
50 TABLET ORAL AT BEDTIME
Refills: 0 | Status: DISCONTINUED | OUTPATIENT
Start: 2020-01-21 | End: 2020-01-22

## 2020-01-21 RX ORDER — ESCITALOPRAM OXALATE 10 MG/1
20 TABLET, FILM COATED ORAL DAILY
Refills: 0 | Status: DISCONTINUED | OUTPATIENT
Start: 2020-01-21 | End: 2020-01-22

## 2020-01-21 RX ORDER — ALOGLIPTIN BENZOATE AND PIOGLITAZONE HYDROCHLORIDE 12.5; 15 MG/1; MG/1
1 TABLET, FILM COATED ORAL
Qty: 0 | Refills: 0 | DISCHARGE

## 2020-01-21 RX ORDER — TRAZODONE HCL 50 MG
1 TABLET ORAL
Qty: 0 | Refills: 0 | DISCHARGE

## 2020-01-21 RX ORDER — LIDOCAINE 4 G/100G
10 CREAM TOPICAL ONCE
Refills: 0 | Status: COMPLETED | OUTPATIENT
Start: 2020-01-21 | End: 2020-01-21

## 2020-01-21 RX ORDER — SODIUM CHLORIDE 9 MG/ML
1000 INJECTION INTRAMUSCULAR; INTRAVENOUS; SUBCUTANEOUS
Refills: 0 | Status: DISCONTINUED | OUTPATIENT
Start: 2020-01-21 | End: 2020-01-22

## 2020-01-21 RX ORDER — ATORVASTATIN CALCIUM 80 MG/1
40 TABLET, FILM COATED ORAL AT BEDTIME
Refills: 0 | Status: DISCONTINUED | OUTPATIENT
Start: 2020-01-21 | End: 2020-01-22

## 2020-01-21 RX ORDER — FENOFIBRATE,MICRONIZED 130 MG
1 CAPSULE ORAL
Qty: 0 | Refills: 0 | DISCHARGE

## 2020-01-21 RX ORDER — ENOXAPARIN SODIUM 100 MG/ML
40 INJECTION SUBCUTANEOUS DAILY
Refills: 0 | Status: DISCONTINUED | OUTPATIENT
Start: 2020-01-21 | End: 2020-01-22

## 2020-01-21 RX ADMIN — SODIUM CHLORIDE 1000 MILLILITER(S): 9 INJECTION INTRAMUSCULAR; INTRAVENOUS; SUBCUTANEOUS at 04:54

## 2020-01-21 RX ADMIN — Medication 4: at 18:03

## 2020-01-21 RX ADMIN — Medication 50 MILLIGRAM(S): at 23:24

## 2020-01-21 RX ADMIN — ATORVASTATIN CALCIUM 40 MILLIGRAM(S): 80 TABLET, FILM COATED ORAL at 21:52

## 2020-01-21 RX ADMIN — ONDANSETRON 4 MILLIGRAM(S): 8 TABLET, FILM COATED ORAL at 04:08

## 2020-01-21 RX ADMIN — Medication 30 MILLILITER(S): at 04:09

## 2020-01-21 RX ADMIN — FAMOTIDINE 20 MILLIGRAM(S): 10 INJECTION INTRAVENOUS at 04:09

## 2020-01-21 RX ADMIN — LIDOCAINE 10 MILLILITER(S): 4 CREAM TOPICAL at 04:09

## 2020-01-21 RX ADMIN — Medication 200 MILLIGRAM(S): at 20:29

## 2020-01-21 RX ADMIN — Medication 1 MILLIGRAM(S): at 08:40

## 2020-01-21 RX ADMIN — SODIUM CHLORIDE 1000 MILLILITER(S): 9 INJECTION INTRAMUSCULAR; INTRAVENOUS; SUBCUTANEOUS at 08:40

## 2020-01-21 RX ADMIN — SODIUM CHLORIDE 100 MILLILITER(S): 9 INJECTION INTRAMUSCULAR; INTRAVENOUS; SUBCUTANEOUS at 21:55

## 2020-01-21 RX ADMIN — Medication 1: at 21:53

## 2020-01-21 RX ADMIN — SODIUM CHLORIDE 1000 MILLILITER(S): 9 INJECTION INTRAMUSCULAR; INTRAVENOUS; SUBCUTANEOUS at 17:53

## 2020-01-21 NOTE — ED ADULT NURSE NOTE - OBJECTIVE STATEMENT
54 year old A&Ox3 female presents to ED with steady coordinated gait complaining of chest pressure since 1430 yesterday. PMH MS, DM, hyperthyroidism, stroke with visual deficits, HTN. Patient states she had an epidural yesterday morning, sedated with prop, and shortly after felt chest pressure, HA, and a " weird taste in my mouth." Denies SOB but states, " I feel like I am not getting enough air." Lungs CTA bilaterally, 96% on room air, in NAD, speaking full sentences. CM applied, EKG done. Denies  n/v/d, fevers, chills, abdominal pain, urinary symptoms, weakness, fatigue, numbness, tingling in upper and lower extremities,  blurry vision. VSS updated on plan of care.

## 2020-01-21 NOTE — ED ADULT NURSE REASSESSMENT NOTE - NS ED NURSE REASSESS COMMENT FT1
Patient receiving third L fluid bolus, blood pressure remains 90's/60's. Call out made to PRISCILA Simmons [id number]- will hold Losartan. Will continue to monitor.

## 2020-01-21 NOTE — ED PROVIDER NOTE - CLINICAL SUMMARY MEDICAL DECISION MAKING FREE TEXT BOX
53 yo F PMHx htn, hld, dm, stroke on ASA 81, MS, fibromyalgia, pshsx hernia repairs, presetns to ED c/o left sided CP when waking up from nap last evening at around 5pm. pain is constant, non radiating, non exertional, and not associated w/ sob, nausea, vomiting, diaphroesis, numbness, or tingling. pt took asa 324 prior to arrival. exam shows RUQ tenderness w/ +pastor's sign. will eval for acs as well as US for GB etiology. will tx w/ gi cocktail, reassess.

## 2020-01-21 NOTE — ED PROVIDER NOTE - OBJECTIVE STATEMENT
53 yo F PMHx HTN, HLD, DM, MS (not on meds), fibromyalgia, hyperthyroid, stroke on ASA (no other AC), multiple herniated discs, gastritis, pshsx hernia repairs, presents to ED c/o left sided pressure when waking up at around 5pm last evening. pain is constant, non-radiating, non-exertional. not associated w/ sob, nausea, vomiting, diaphoresis, numbness, tingling, or dizziness. pt took  at around 11pm prior to arrival. denies fever, chills, urinary sx, cough. Pt having BMs and passing flatus.

## 2020-01-21 NOTE — ED ADULT NURSE REASSESSMENT NOTE - NS ED NURSE REASSESS COMMENT FT1
Report received from ARPITA Perez. Patient resting comfortably in bed, no current complaints. Patient admitted and awaiting bed assignment.

## 2020-01-21 NOTE — H&P ADULT - RS GEN PE MLT RESP DETAILS PC
airway patent/good air movement/respirations non-labored/no rhonchi/normal/breath sounds equal/clear to auscultation bilaterally/no chest wall tenderness/no intercostal retractions/no rales

## 2020-01-21 NOTE — CONSULT NOTE ADULT - SUBJECTIVE AND OBJECTIVE BOX
HISTORY OF PRESENT ILLNESS: HPI:  53 y/o Female with PMHx HTN, HLD, Type 2 DM, Multiple Sclerosis, fibromyalgia, hyperthyroidism, hx CVA on ASA, multiple herniated discs, gastritis, hx hernia repairs presented with left sided chest pressure admitted for further evaluation. Patient reports waking up with left sided chest pressure that is constant, non radiating and nonexertional. Patient reports having an episode of palpitations. Patient reports negative stress test 4 years ago. Reports she had stroke workup and denies any history of atrial fibrillation. Denies SOB, CROW, LE edema, abd pain, n/v, back pain, fever/chills.      PAST MEDICAL & SURGICAL HISTORY:  Multiple sclerosis  Hyperthyroidism  Neuropathy due to type 2 diabetes mellitus  Stroke: &quot;visual agnosia, b/L cerebellar&quot; 2013  Hypertension  Diabetes  H/O: :   H/O fasciotomy: IV infiltrated on L hand - Median and ulnar nerve damage  Abdominal hernia: ventral hernias, multiple, with mesh, lysis of adhesions in       MEDICATIONS:  MEDICATIONS  (STANDING):  dextrose 5%. 1000 milliLiter(s) (50 mL/Hr) IV Continuous <Continuous>  dextrose 50% Injectable 12.5 Gram(s) IV Push once  dextrose 50% Injectable 25 Gram(s) IV Push once  dextrose 50% Injectable 25 Gram(s) IV Push once  insulin lispro (HumaLOG) corrective regimen sliding scale   SubCutaneous three times a day before meals  insulin lispro (HumaLOG) corrective regimen sliding scale   SubCutaneous at bedtime      Allergies    Plavix (Other)    Intolerances        FAMILY HISTORY:  No pertinent family history in first degree relatives    Non-contributary for premature coronary disease or sudden cardiac death    SOCIAL HISTORY:    [ ] Non-smoker  [x ] Former Smoker  [ ] Alcohol      REVIEW OF SYSTEMS:  [x ]chest pain  [  ]shortness of breath  [ x ]palpitations  [  ]syncope  [ ]near syncope [ ]upper extremity weakness   [ ] lower extremity weakness  [  ]diplopia  [  ]altered mental status   [  ]fevers  [ ]chills [ ]nausea  [ ]vomitting  [  ]dysphagia    [ ]abdominal pain  [ ]melena  [ ]BRBPR    [  ]epistaxis  [  ]rash    [ ]lower extremity edema        [ x] All others negative	  [ ] Unable to obtain      LABS:	 	    CARDIAC MARKERS:                              11.7   6.21  )-----------( 254      ( 2020 03:54 )             36.4     Hb Trend: 11.7<--        137  |  99  |  18  ----------------------------<  188<H>  3.9   |  21<L>  |  0.61    Ca    9.9      2020 03:54    TPro  7.0  /  Alb  4.1  /  TBili  0.2  /  DBili  x   /  AST  21  /  ALT  35  /  AlkPhos  74      Creatinine Trend: 0.61<--    Coags:      proBNP:   Lipid Profile:   HgA1c:   TSH:         PHYSICAL EXAM:  T(C): 36.8 (20 @ 07:35), Max: 36.8 (20 @ 02:29)  HR: 83 (20 @ 09:13) (83 - 97)  BP: 102/67 (20 @ 09:13) (96/69 - 135/79)  RR: 18 (20 @ 07:35) (16 - 18)  SpO2: 95% (20 @ 07:35) (95% - 96%)  Wt(kg): --   BMI (kg/m2): 42.4 (20 @ 02:29)  I&O's Summary      Gen: Appears well in NAD  HEENT:  (-)icterus (-)pallor  CV: N S1 S2 1/6 MAGNUS (+)2 Pulses B/l  Resp:  Clear to auscultation B/L, normal effort  GI: (+) BS Soft, NT, ND  Lymph:  (-)Edema, (-)obvious lymphadenopathy  Skin: Warm to touch, Normal turgor  Psych: Appropriate mood and affect      TELEMETRY: SR 90s 	      ECG: Sinus Rhythm with sinus arrhythmia 	    RADIOLOGY:         CXR: < from: Xray Chest 2 Views PA/Lat (20 @ 06:09) >  IMPRESSION:     Clear lungs.    < end of copied text >      ASSESSMENT/PLAN: 53 y/o Female with PMHx HTN, HLD, Type 2 DM, Multiple Sclerosis, fibromyalgia, hyperthyroidism, hx CVA on ASA, multiple herniated discs, gastritis, hx hernia repairs presented with left sided chest pressure admitted for further evaluation.    - MI ruled out with negative troponin x 2  - EKG with no acute ischemic changes  - Monitor tele  - Check TTE to eval LV function  - Check Pharm NST  - Further recs pending above    Pranav Peterson PA-C  [facility]  Pager: [phone]

## 2020-01-21 NOTE — ED PROVIDER NOTE - ATTENDING CONTRIBUTION TO CARE
Attending MD Wilson: I personally have seen and examined this patient.  Resident note reviewed and agree on plan of care and except where noted.  See below for details.     Seen in [place]    54F with PMH/PSH including HTN, DM, HLD, MS, fibromyalgia, CVA on ASA 81mg, chronic back pain, disc herniations presents to the ED with L sided chest pain.  Reports pain is pressure like, left sided, nonradiating, non exertional, constant.  Reports started suddenly at around 5pm when she woke up from nap.  Reports took 324mg of ASA at 11pm.  Denies shortness of breath, palpitations.  Denies hemoptysis, cough.  Denies abdominal pain, nausea, vomiting, diarrhea, blood in stools. Denies recent surgery, immobilization, recent travel.      TO BE COMPLETED     A/P: 54F with chest pain, DDx includes unstable angina, will obtain EKG, CXR, trop, labs, also includes cholecystitis, will obtain US and labs, will give antiemetic, reassess Attending MD Wilson: I personally have seen and examined this patient.  Resident note reviewed and agree on plan of care and except where noted.  See below for details.     Seen in [place]    54F with PMH/PSH including HTN, DM, HLD, MS, fibromyalgia, CVA on ASA 81mg, chronic back pain, disc herniations presents to the ED with L sided chest pain.  Reports pain is pressure like, left sided, nonradiating, non exertional, constant.  Reports started suddenly at around 5pm when she woke up from nap.  Reports took 324mg of ASA at 11pm.  Denies shortness of breath, palpitations.  Denies hemoptysis, cough.  Denies abdominal pain, nausea, vomiting, diarrhea, blood in stools. Denies recent surgery, immobilization, recent travel.  On exam, NAD, head NCAT, PERRL, FROM at neck, no tenderness to midline palpation, no stepoffs along length of spine, lungs CTAB with good inspiratory effort, +S1S2, no m/r/g, abdomen soft with +BS, +RUQ tenderness, no rebond, no guarding, neg Sotelo's for this MD, ND, no CVAT, moving all extremities with 5/5 strength bilateral upper and lower extremities, good and equal  strength bilaterally; A/P: 54F with chest pain, DDx includes unstable angina, will obtain EKG, CXR, trop, labs, also includes cholecystitis, will obtain US and labs, will give antiemetic, reassess

## 2020-01-21 NOTE — ED PROVIDER NOTE - PHYSICAL EXAMINATION
PHYSICAL EXAM:  GENERAL: non-toxic appearing; in no respiratory distress  HEAD: Atraumatic, Normocephalic;  NECK: No JVD; FROM  EYES: PERRL, EOMs intact b/l w/out deficits  CHEST/LUNG: CTAB no wheezes/rhonchi/rales  HEART: RRR no murmur/gallops/rubs  ABDOMEN: +BS, obese; soft, RUQ tenderness w/ +Sotelo's sign; non-distended;   EXTREMITIES: No LE edema, +2 radial pulses b/l  MUSCULOSKELETAL: FROM of all 4 extremities;   NERVOUS SYSTEM:  A&Ox3, No motor deficits or sensory deficits; CNII-XII intact; no focal neurologic deficits  SKIN:  No new rashes

## 2020-01-21 NOTE — ED PROVIDER NOTE - PROGRESS NOTE DETAILS
Attending MD Wilson: Attempted to reach Dr. Bangura [phone], redirects to *[phone] but call could not be completed. Jose C Rajan MD. pt reassessed and reevaluated. pt still w/ CP. pt  to epigastrium. US showing mild pericholecystic fluid but no other signs of kelsea. will obtain ct a/p.

## 2020-01-21 NOTE — ED PROVIDER NOTE - NS ED ROS FT
Constitutional: no fevers or chills  HEENT: no visual changes, no sore throat, no rhinorrhea  CV: cp; no palpitations  Resp: no sob or cough  GI: no abd pain, no nausea, no vomiting, no diarrhea, no constipation  : no dysuria, no hematuria  MSK: no myalgais or arthralgias  skin: no rashes  neuro: no HA, no numbness; no weakness, no tingling  ROS statement: all other ROS negative except as per HPI

## 2020-01-21 NOTE — ED ADULT NURSE REASSESSMENT NOTE - NS ED NURSE REASSESS COMMENT FT1
Report given to ARPITA [place] 1900 in red; patient admitted awaiting bed assignment. Family at bedside resting comfortably in bed.

## 2020-01-21 NOTE — ED ADULT NURSE REASSESSMENT NOTE - NS ED NURSE REASSESS COMMENT FT1
Report received from RN Colletta at 0700 in RED; reports to left sided chest pressure with no SOB, lightheadedness, or dizziness. RUQ tenderness noted on palpation, abdomen soft and non distended- reports to last BM yesterday. Awaiting CT scan of abdomen and pelvis, patient resting comfortably in bed, will continue to monitor. Report received from RN Colletta at 0700 in RED; reports to left sided chest pressure with no SOB, lightheadedness, or dizziness. RUQ tenderness noted on palpation, abdomen soft and non distended- reports to last BM yesterday. Awaiting CT scan of abdomen and pelvis, reports to being unable to perform test without medication. MD Heath made aware - Ativan 1mg IVP ordered. Patient resting comfortably in bed, will continue to monitor.

## 2020-01-21 NOTE — ED ADULT NURSE REASSESSMENT NOTE - NS ED NURSE REASSESS COMMENT FT1
Patient BP 96/69 prior to CT scan of the Abdomen and pelvis. MD Heath made aware. 1 L fluid bolus and Ativan 1 mg given prior to CT scan. Patient resting comfortably in bed, will continue to monitor.

## 2020-01-21 NOTE — H&P ADULT - PROBLEM SELECTOR PLAN 2
Holding PO meds so SSI for now. LFT ,US and CT abdomen noted. Tolerating PO . If pain or any other symptoms ,will get HIDA scan.

## 2020-01-21 NOTE — CONSULT NOTE ADULT - ATTENDING COMMENTS
Patient seen and examined.  Agree with above.   Admitted with left sided chest pressure at rest  MI ruled out  Check TTE  Check NST  Further workup pending above    Shalini Mortensen MD

## 2020-01-22 ENCOUNTER — TRANSCRIPTION ENCOUNTER (OUTPATIENT)
Age: 55
End: 2020-01-22

## 2020-01-22 VITALS
TEMPERATURE: 99 F | RESPIRATION RATE: 18 BRPM | DIASTOLIC BLOOD PRESSURE: 76 MMHG | OXYGEN SATURATION: 96 % | HEART RATE: 88 BPM | SYSTOLIC BLOOD PRESSURE: 136 MMHG

## 2020-01-22 LAB
ALBUMIN SERPL ELPH-MCNC: 4 G/DL — SIGNIFICANT CHANGE UP (ref 3.3–5)
ALP SERPL-CCNC: 58 U/L — SIGNIFICANT CHANGE UP (ref 40–120)
ALT FLD-CCNC: 36 U/L — SIGNIFICANT CHANGE UP (ref 10–45)
ANION GAP SERPL CALC-SCNC: 8 MMOL/L — SIGNIFICANT CHANGE UP (ref 5–17)
AST SERPL-CCNC: 25 U/L — SIGNIFICANT CHANGE UP (ref 10–40)
BASOPHILS # BLD AUTO: 0.02 K/UL — SIGNIFICANT CHANGE UP (ref 0–0.2)
BASOPHILS NFR BLD AUTO: 0.4 % — SIGNIFICANT CHANGE UP (ref 0–2)
BILIRUB SERPL-MCNC: 0.2 MG/DL — SIGNIFICANT CHANGE UP (ref 0.2–1.2)
BUN SERPL-MCNC: 13 MG/DL — SIGNIFICANT CHANGE UP (ref 7–23)
CALCIUM SERPL-MCNC: 9 MG/DL — SIGNIFICANT CHANGE UP (ref 8.4–10.5)
CHLORIDE SERPL-SCNC: 103 MMOL/L — SIGNIFICANT CHANGE UP (ref 96–108)
CO2 SERPL-SCNC: 29 MMOL/L — SIGNIFICANT CHANGE UP (ref 22–31)
CREAT SERPL-MCNC: 0.46 MG/DL — LOW (ref 0.5–1.3)
EOSINOPHIL # BLD AUTO: 0.19 K/UL — SIGNIFICANT CHANGE UP (ref 0–0.5)
EOSINOPHIL NFR BLD AUTO: 3.7 % — SIGNIFICANT CHANGE UP (ref 0–6)
GLUCOSE BLDC GLUCOMTR-MCNC: 174 MG/DL — HIGH (ref 70–99)
GLUCOSE BLDC GLUCOMTR-MCNC: 260 MG/DL — HIGH (ref 70–99)
GLUCOSE SERPL-MCNC: 185 MG/DL — HIGH (ref 70–99)
HBA1C BLD-MCNC: 8.5 % — HIGH (ref 4–5.6)
HCT VFR BLD CALC: 36.2 % — SIGNIFICANT CHANGE UP (ref 34.5–45)
HCV AB S/CO SERPL IA: 0.12 S/CO — SIGNIFICANT CHANGE UP (ref 0–0.99)
HCV AB SERPL-IMP: SIGNIFICANT CHANGE UP
HGB BLD-MCNC: 11.3 G/DL — LOW (ref 11.5–15.5)
IMM GRANULOCYTES NFR BLD AUTO: 0.2 % — SIGNIFICANT CHANGE UP (ref 0–1.5)
LACTATE SERPL-SCNC: 1.7 MMOL/L — SIGNIFICANT CHANGE UP (ref 0.7–2)
LYMPHOCYTES # BLD AUTO: 1.89 K/UL — SIGNIFICANT CHANGE UP (ref 1–3.3)
LYMPHOCYTES # BLD AUTO: 37.2 % — SIGNIFICANT CHANGE UP (ref 13–44)
MCHC RBC-ENTMCNC: 28.1 PG — SIGNIFICANT CHANGE UP (ref 27–34)
MCHC RBC-ENTMCNC: 31.2 GM/DL — LOW (ref 32–36)
MCV RBC AUTO: 90 FL — SIGNIFICANT CHANGE UP (ref 80–100)
MONOCYTES # BLD AUTO: 0.55 K/UL — SIGNIFICANT CHANGE UP (ref 0–0.9)
MONOCYTES NFR BLD AUTO: 10.8 % — SIGNIFICANT CHANGE UP (ref 2–14)
NEUTROPHILS # BLD AUTO: 2.42 K/UL — SIGNIFICANT CHANGE UP (ref 1.8–7.4)
NEUTROPHILS NFR BLD AUTO: 47.7 % — SIGNIFICANT CHANGE UP (ref 43–77)
PLATELET # BLD AUTO: 224 K/UL — SIGNIFICANT CHANGE UP (ref 150–400)
POTASSIUM SERPL-MCNC: 4 MMOL/L — SIGNIFICANT CHANGE UP (ref 3.5–5.3)
POTASSIUM SERPL-SCNC: 4 MMOL/L — SIGNIFICANT CHANGE UP (ref 3.5–5.3)
PROT SERPL-MCNC: 6.7 G/DL — SIGNIFICANT CHANGE UP (ref 6–8.3)
RBC # BLD: 4.02 M/UL — SIGNIFICANT CHANGE UP (ref 3.8–5.2)
RBC # FLD: 14.5 % — SIGNIFICANT CHANGE UP (ref 10.3–14.5)
SODIUM SERPL-SCNC: 140 MMOL/L — SIGNIFICANT CHANGE UP (ref 135–145)
WBC # BLD: 5.08 K/UL — SIGNIFICANT CHANGE UP (ref 3.8–10.5)
WBC # FLD AUTO: 5.08 K/UL — SIGNIFICANT CHANGE UP (ref 3.8–10.5)

## 2020-01-22 PROCEDURE — 82947 ASSAY GLUCOSE BLOOD QUANT: CPT

## 2020-01-22 PROCEDURE — 84132 ASSAY OF SERUM POTASSIUM: CPT

## 2020-01-22 PROCEDURE — 83690 ASSAY OF LIPASE: CPT

## 2020-01-22 PROCEDURE — 82330 ASSAY OF CALCIUM: CPT

## 2020-01-22 PROCEDURE — 85027 COMPLETE CBC AUTOMATED: CPT

## 2020-01-22 PROCEDURE — 78452 HT MUSCLE IMAGE SPECT MULT: CPT | Mod: 26

## 2020-01-22 PROCEDURE — C8929: CPT

## 2020-01-22 PROCEDURE — 80053 COMPREHEN METABOLIC PANEL: CPT

## 2020-01-22 PROCEDURE — 82962 GLUCOSE BLOOD TEST: CPT

## 2020-01-22 PROCEDURE — 81003 URINALYSIS AUTO W/O SCOPE: CPT

## 2020-01-22 PROCEDURE — 93306 TTE W/DOPPLER COMPLETE: CPT | Mod: 26

## 2020-01-22 PROCEDURE — 74177 CT ABD & PELVIS W/CONTRAST: CPT

## 2020-01-22 PROCEDURE — 93018 CV STRESS TEST I&R ONLY: CPT

## 2020-01-22 PROCEDURE — 83036 HEMOGLOBIN GLYCOSYLATED A1C: CPT

## 2020-01-22 PROCEDURE — 78452 HT MUSCLE IMAGE SPECT MULT: CPT

## 2020-01-22 PROCEDURE — 99285 EMERGENCY DEPT VISIT HI MDM: CPT | Mod: 25

## 2020-01-22 PROCEDURE — 93017 CV STRESS TEST TRACING ONLY: CPT

## 2020-01-22 PROCEDURE — 85014 HEMATOCRIT: CPT

## 2020-01-22 PROCEDURE — A9500: CPT

## 2020-01-22 PROCEDURE — 96374 THER/PROPH/DIAG INJ IV PUSH: CPT

## 2020-01-22 PROCEDURE — 84484 ASSAY OF TROPONIN QUANT: CPT

## 2020-01-22 PROCEDURE — 96375 TX/PRO/DX INJ NEW DRUG ADDON: CPT

## 2020-01-22 PROCEDURE — 82803 BLOOD GASES ANY COMBINATION: CPT

## 2020-01-22 PROCEDURE — 93016 CV STRESS TEST SUPVJ ONLY: CPT

## 2020-01-22 PROCEDURE — 83605 ASSAY OF LACTIC ACID: CPT

## 2020-01-22 PROCEDURE — 76705 ECHO EXAM OF ABDOMEN: CPT

## 2020-01-22 PROCEDURE — 93005 ELECTROCARDIOGRAM TRACING: CPT | Mod: 76

## 2020-01-22 PROCEDURE — 84295 ASSAY OF SERUM SODIUM: CPT

## 2020-01-22 PROCEDURE — 86803 HEPATITIS C AB TEST: CPT

## 2020-01-22 PROCEDURE — 71046 X-RAY EXAM CHEST 2 VIEWS: CPT

## 2020-01-22 PROCEDURE — 82435 ASSAY OF BLOOD CHLORIDE: CPT

## 2020-01-22 PROCEDURE — 82565 ASSAY OF CREATININE: CPT

## 2020-01-22 PROCEDURE — 99238 HOSP IP/OBS DSCHRG MGMT 30/<: CPT

## 2020-01-22 RX ORDER — ATORVASTATIN CALCIUM 80 MG/1
1 TABLET, FILM COATED ORAL
Qty: 30 | Refills: 0

## 2020-01-22 RX ORDER — CARVEDILOL PHOSPHATE 80 MG/1
1 CAPSULE, EXTENDED RELEASE ORAL
Qty: 60 | Refills: 0

## 2020-01-22 RX ORDER — IBUPROFEN 200 MG
0 TABLET ORAL
Qty: 0 | Refills: 0 | DISCHARGE

## 2020-01-22 RX ORDER — CARVEDILOL PHOSPHATE 80 MG/1
1 CAPSULE, EXTENDED RELEASE ORAL
Qty: 0 | Refills: 0 | DISCHARGE
Start: 2020-01-22

## 2020-01-22 RX ORDER — ESCITALOPRAM OXALATE 10 MG/1
1 TABLET, FILM COATED ORAL
Qty: 0 | Refills: 0 | DISCHARGE
Start: 2020-01-22

## 2020-01-22 RX ORDER — ESCITALOPRAM OXALATE 10 MG/1
1 TABLET, FILM COATED ORAL
Qty: 30 | Refills: 0

## 2020-01-22 RX ORDER — ATORVASTATIN CALCIUM 80 MG/1
1 TABLET, FILM COATED ORAL
Qty: 0 | Refills: 0 | DISCHARGE
Start: 2020-01-22

## 2020-01-22 RX ADMIN — Medication 200 MILLIGRAM(S): at 06:17

## 2020-01-22 RX ADMIN — PANTOPRAZOLE SODIUM 40 MILLIGRAM(S): 20 TABLET, DELAYED RELEASE ORAL at 06:17

## 2020-01-22 RX ADMIN — ESCITALOPRAM OXALATE 20 MILLIGRAM(S): 10 TABLET, FILM COATED ORAL at 17:58

## 2020-01-22 RX ADMIN — Medication 81 MILLIGRAM(S): at 17:57

## 2020-01-22 RX ADMIN — Medication 200 MILLIGRAM(S): at 17:57

## 2020-01-22 RX ADMIN — Medication 2: at 10:34

## 2020-01-22 RX ADMIN — CARVEDILOL PHOSPHATE 6.25 MILLIGRAM(S): 80 CAPSULE, EXTENDED RELEASE ORAL at 17:57

## 2020-01-22 RX ADMIN — Medication 6: at 18:00

## 2020-01-22 RX ADMIN — Medication 145 MILLIGRAM(S): at 17:58

## 2020-01-22 NOTE — DISCHARGE NOTE PROVIDER - NSDCMRMEDTOKEN_GEN_ALL_CORE_FT
aspirin 81 mg oral delayed release tablet: 1 tab(s) orally once a day  atorvastatin 40 mg oral tablet: 1 tab(s) orally once a day (at bedtime)  carvedilol 6.25 mg oral tablet: 1 tab(s) orally every 12 hours  Colace 100 mg oral capsule: 1 cap(s) orally once a day (at bedtime)  escitalopram 20 mg oral tablet: 1 tab(s) orally once a day  fenofibrate micronized 200 mg oral capsule: 1 cap(s) orally once a day  metFORMIN 1000 mg oral tablet: 1 tab(s) orally 2 times a day  methIMAzole 5 mg oral tablet: 1 tab(s) orally 6 times a week EXCEPT SAT  omeprazole 40 mg oral delayed release capsule: 1 cap(s) orally once a day  Oseni 25 mg-30 mg oral tablet: 1 tab(s) orally once a day  OZEMPIC: 1 milliliter(s) subcutaneous once a week  pregabalin 200 mg oral capsule: 1 cap(s) orally 2 times a day  traZODone 50 mg oral tablet: 1 tab(s) orally once a day (at bedtime)  valsartan 160 mg oral tablet: 1 tab(s) orally once a day  Vitamin D2 50,000 intl units (1.25 mg) oral capsule: 1 cap(s) orally once a week

## 2020-01-22 NOTE — PROGRESS NOTE ADULT - SUBJECTIVE AND OBJECTIVE BOX
S: Still with left sided chest pressure. Denies SOB. Review of systems otherwise (-)  	    MEDICATIONS  (STANDING):  aspirin enteric coated 81 milliGRAM(s) Oral daily  atorvastatin 40 milliGRAM(s) Oral at bedtime  carvedilol 6.25 milliGRAM(s) Oral every 12 hours  dextrose 5%. 1000 milliLiter(s) (50 mL/Hr) IV Continuous <Continuous>  dextrose 50% Injectable 12.5 Gram(s) IV Push once  dextrose 50% Injectable 25 Gram(s) IV Push once  dextrose 50% Injectable 25 Gram(s) IV Push once  enoxaparin Injectable 40 milliGRAM(s) SubCutaneous daily  ergocalciferol 54446 Unit(s) Oral every week  escitalopram 20 milliGRAM(s) Oral daily  fenofibrate Tablet 145 milliGRAM(s) Oral daily  insulin lispro (HumaLOG) corrective regimen sliding scale   SubCutaneous three times a day before meals  insulin lispro (HumaLOG) corrective regimen sliding scale   SubCutaneous at bedtime  methimazole 5 milliGRAM(s) Oral daily  pantoprazole    Tablet 40 milliGRAM(s) Oral before breakfast  pregabalin 200 milliGRAM(s) Oral two times a day  sodium chloride 0.9%. 1000 milliLiter(s) (100 mL/Hr) IV Continuous <Continuous>  traZODone 50 milliGRAM(s) Oral at bedtime      LABS:	 	                          11.3   5.08  )-----------( 224      ( 22 Jan 2020 08:29 )             36.2     Hemoglobin: 11.3 g/dL (01-22 @ 08:29)  Hemoglobin: 11.7 g/dL (01-21 @ 03:54)    01-22    140  |  103  |  13  ----------------------------<  185<H>  4.0   |  29  |  0.46<L>    Ca    9.0      22 Jan 2020 06:36    TPro  6.7  /  Alb  4.0  /  TBili  0.2  /  DBili  x   /  AST  25  /  ALT  36  /  AlkPhos  58  01-22    Creatinine Trend: 0.46<--, 0.61<--  COAGS:       proBNP:   Lipid Profile:   HgA1c: Hemoglobin A1C, Whole Blood: 8.5 % (01-22 @ 08:29)    TSH:     PHYSICAL EXAM:  T(C): 36.9 (01-22-20 @ 10:30), Max: 37.4 (01-21-20 @ 20:47)  HR: 92 (01-22-20 @ 10:30) (77 - 92)  BP: 101/64 (01-22-20 @ 10:30) (96/60 - 111/66)  RR: 18 (01-22-20 @ 10:30) (18 - 18)  SpO2: 93% (01-22-20 @ 10:30) (93% - 96%)  Wt(kg): --  I&O's Summary        Gen: Appears well in NAD  HEENT:  (-)icterus (-)pallor  CV: N S1 S2 1/6 MAGNUS (+)2 Pulses B/l  Resp:  Clear to auscultation B/L, normal effort  GI: (+) BS Soft, NT, ND  Lymph:  (-)Edema, (-)obvious lymphadenopathy  Skin: Warm to touch, Normal turgor  Psych: Appropriate mood and affect      TELEMETRY: SR 80s	      < from: TTE with Doppler (w/Cont) (01.22.20 @ 08:35) >  EF (Visual Estimate): 65 %  Doppler Peak Velocity (m/sec): TV=2.4  ------------------------------------------------------------------------  Observations:  Mitral Valve: Normal appearing mitral valve leaflets.  Mild mitral regurgitation.  Aortic Valve/Aorta: Normal aortic valve.  Normal aortic root size.  Left Atrium: Mildly dilated left atrium.  Left Ventricle: Endocardial visualization enhanced with  intravenous injection of Ultrasonic Enhancing Agent  (Definity).  Normal left ventricular internal dimensions and wall  thicknesses.  Normal left ventricular systolic function. No segmental  wall motion abnormalities.  Right Heart: Normal right atrium. Normal right ventricular  size and function. Normal tricuspid valve. Minimal  tricuspid regurgitation. Normal pulmonic valve.  Pericardium/Pleura: Normal pericardium with no pericardial  effusion.  Hemodynamic: No evidence of pulmonary hypertension.  ------------------------------------------------------------------------  Conclusions:  Endocardial visualization enhanced with intravenous  injection of Ultrasonic Enhancing Agent (Definity).  Normal left ventricular systolic function. No segmental  wall motion abnormalities.    < end of copied text >      ASSESSMENT/PLAN: 53 y/o Female with PMHx HTN, HLD, Type 2 DM, Multiple Sclerosis, fibromyalgia, hyperthyroidism, hx CVA on ASA, multiple herniated discs, gastritis, hx hernia repairs presented with left sided chest pressure admitted for further evaluation.    - Tele stable  - MI ruled out with negative troponin x 2  - EKG with no acute ischemic changes  - TTE noted above with normal LV function, no segmental WMA  - Follow up Pharm NST  - Further recs pending above    Pranav Peterson PA-C  [facility]  Pager: [phone] S: Still with left sided chest pressure. Denies SOB. Review of systems otherwise (-)  	    MEDICATIONS  (STANDING):  aspirin enteric coated 81 milliGRAM(s) Oral daily  atorvastatin 40 milliGRAM(s) Oral at bedtime  carvedilol 6.25 milliGRAM(s) Oral every 12 hours  dextrose 5%. 1000 milliLiter(s) (50 mL/Hr) IV Continuous <Continuous>  dextrose 50% Injectable 12.5 Gram(s) IV Push once  dextrose 50% Injectable 25 Gram(s) IV Push once  dextrose 50% Injectable 25 Gram(s) IV Push once  enoxaparin Injectable 40 milliGRAM(s) SubCutaneous daily  ergocalciferol  Unit(s) Oral every week  escitalopram 20 milliGRAM(s) Oral daily  fenofibrate Tablet 145 milliGRAM(s) Oral daily  insulin lispro (HumaLOG) corrective regimen sliding scale   SubCutaneous three times a day before meals  insulin lispro (HumaLOG) corrective regimen sliding scale   SubCutaneous at bedtime  methimazole 5 milliGRAM(s) Oral daily  pantoprazole    Tablet 40 milliGRAM(s) Oral before breakfast  pregabalin 200 milliGRAM(s) Oral two times a day  sodium chloride 0.9%. 1000 milliLiter(s) (100 mL/Hr) IV Continuous <Continuous>  traZODone 50 milliGRAM(s) Oral at bedtime      LABS:	 	                          11.3   5.08  )-----------( 224      ( 22 Jan 2020 08:29 )             36.2     Hemoglobin: 11.3 g/dL (01-22 @ 08:29)  Hemoglobin: 11.7 g/dL (01-21 @ 03:54)    01-22    140  |  103  |  13  ----------------------------<  185<H>  4.0   |  29  |  0.46<L>    Ca    9.0      22 Jan 2020 06:36    TPro  6.7  /  Alb  4.0  /  TBili  0.2  /  DBili  x   /  AST  25  /  ALT  36  /  AlkPhos  58  01-22    Creatinine Trend: 0.46<--, 0.61<--  COAGS:       proBNP:   Lipid Profile:   HgA1c: Hemoglobin A1C, Whole Blood: 8.5 % (01-22 @ 08:29)    TSH:     PHYSICAL EXAM:  T(C): 36.9 (01-22-20 @ 10:30), Max: 37.4 (01-21-20 @ 20:47)  HR: 92 (01-22-20 @ 10:30) (77 - 92)  BP: 101/64 (01-22-20 @ 10:30) (96/60 - 111/66)  RR: 18 (01-22-20 @ 10:30) (18 - 18)  SpO2: 93% (01-22-20 @ 10:30) (93% - 96%)  Wt(kg): --  I&O's Summary        Gen: Appears well in NAD  HEENT:  (-)icterus (-)pallor  CV: N S1 S2 1/6 MAGNUS (+)2 Pulses B/l  Resp:  Clear to auscultation B/L, normal effort  GI: (+) BS Soft, NT, ND  Lymph:  (-)Edema, (-)obvious lymphadenopathy  Skin: Warm to touch, Normal turgor  Psych: Appropriate mood and affect      TELEMETRY: SR 80s	      < from: TTE with Doppler (w/Cont) (01.22.20 @ 08:35) >  EF (Visual Estimate): 65 %  Doppler Peak Velocity (m/sec): TV=2.4  ------------------------------------------------------------------------  Observations:  Mitral Valve: Normal appearing mitral valve leaflets.  Mild mitral regurgitation.  Aortic Valve/Aorta: Normal aortic valve.  Normal aortic root size.  Left Atrium: Mildly dilated left atrium.  Left Ventricle: Endocardial visualization enhanced with  intravenous injection of Ultrasonic Enhancing Agent  (Definity).  Normal left ventricular internal dimensions and wall  thicknesses.  Normal left ventricular systolic function. No segmental  wall motion abnormalities.  Right Heart: Normal right atrium. Normal right ventricular  size and function. Normal tricuspid valve. Minimal  tricuspid regurgitation. Normal pulmonic valve.  Pericardium/Pleura: Normal pericardium with no pericardial  effusion.  Hemodynamic: No evidence of pulmonary hypertension.  ------------------------------------------------------------------------  Conclusions:  Endocardial visualization enhanced with intravenous  injection of Ultrasonic Enhancing Agent (Definity).  Normal left ventricular systolic function. No segmental  wall motion abnormalities.    < end of copied text >      ASSESSMENT/PLAN: 53 y/o Female with PMHx HTN, HLD, Type 2 DM, Multiple Sclerosis, fibromyalgia, hyperthyroidism, hx CVA on ASA, multiple herniated discs, gastritis, hx hernia repairs presented with left sided chest pressure admitted for further evaluation.    - Tele stable  - MI ruled out with negative troponin x 2  - EKG with no acute ischemic changes  - Continue ASA/Statin  - TTE noted above with normal LV function, no segmental WMA  - Follow up Pharm NST  - Further recs pending above    Pranav Peterson PA-C  [facility]  Pager: [phone]

## 2020-01-22 NOTE — PROGRESS NOTE ADULT - ASSESSMENT
55 yo F PMHx HTN, HLD, DM, MS (not on meds), fibromyalgia, hyperthyroid, stroke on ASA (no other AC), multiple herniated discs, gastritis, pshsx hernia repairs, presents to ED c/o left sided pressure when waking up at around 5pm last evening. pain is constant, non-radiating, non-exertional. not associated w/ sob, nausea, vomiting, diaphoresis, numbness, tingling, or dizziness. pt took  at around 11pm prior to arrival. denies fever, chills, urinary sx, cough. Pt having BMs and passing flatus.     Problem/Plan - 1:  ·  Problem: Chest pain.  Plan: Unknown etiology .Resolved.   R/O ACS . trop neg x 2. Cardiology consult noted and awaiting TTE and NST noted  . No suspicion of PE.      Problem/Plan - 2:  ·  Problem: R/O RUQ abdominal pain. Plan: LFT ,US and CT abdomen noted. Tolerating PO . If pain or any other symptoms ,will get HIDA scan.     Problem/Plan - 3:  ·  Problem: Diabetes. Plan: Holding PO meds so SSI for now.     Problem/Plan - 4:  ·  Problem: Hypertension. Plan: BP low so hold parameters.     Problem/Plan - 5:  ·  Problem: Hyperthyroidism. Plan: Home dose of Methimazole.     Problem/Plan - 6:  Problem: Neuropathy due to type 2 diabetes mellitus.Plan: Lyrica.     Problem/Plan - 7:  ·  Problem: Stroke.  Plan: old . Ambulates and eats fine.     Disposition DC planning home  to follow up outpt.

## 2020-01-22 NOTE — CHART NOTE - NSCHARTNOTEFT_GEN_A_CORE
Emergency Social Work Note:  LMSW received a referral for assistance with discharge planning. Per medical patient is medically cleared for discharge.  LMSW introduced herself to the patient and verbalized understanding her role.  LMSW contacted administration regarding transportation. , Delfina, authorized car transportation voucher. Patient declined to identify a primary caregiver.   LMSW will continue to follow up as needed.

## 2020-01-22 NOTE — DISCHARGE NOTE PROVIDER - PROVIDER TOKENS
PROVIDER:[[id number]],FOLLOWUP:[1 week],ESTABLISHEDPATIENT:[T]] PROVIDER:[TOKEN:[[id number]],FOLLOWUP:[1 week],ESTABLISHEDPATIENT:[T]],PROVIDER:[TOKEN:[[id number]],FOLLOWUP:[1 week],ESTABLISHEDPATIENT:[T]]

## 2020-01-22 NOTE — DISCHARGE NOTE NURSING/CASE MANAGEMENT/SOCIAL WORK - NSDCPEPTSTRK_GEN_ALL_CORE
Call 911 for stroke/Risk factors for stroke/Stroke education booklet/Stroke support groups for patients, families, and friends/Prescribed medications/Need for follow up after discharge/Stroke warning signs and symptoms/Signs and symptoms of stroke

## 2020-01-22 NOTE — PROGRESS NOTE ADULT - SUBJECTIVE AND OBJECTIVE BOX
INTERVAL HPI/OVERNIGHT EVENTS: Seen and examined earlier in stress test lab . I want to go home if test good as feel fine.   Vital Signs Last 24 Hrs  T(C): 37.1 (2020 17:54), Max: 37.4 (2020 20:47)  T(F): 98.7 (2020 17:54), Max: 99.3 (2020 20:47)  HR: 88 (2020 17:54) (77 - 92)  BP: 136/76 (2020 17:54) (101/64 - 136/76)  BP(mean): --  RR: 18 (2020 17:54) (18 - 18)  SpO2: 96% (2020 17:54) (93% - 96%)  I&O's Summary    MEDICATIONS  (STANDING):  aspirin enteric coated 81 milliGRAM(s) Oral daily  atorvastatin 40 milliGRAM(s) Oral at bedtime  carvedilol 6.25 milliGRAM(s) Oral every 12 hours  dextrose 5%. 1000 milliLiter(s) (50 mL/Hr) IV Continuous <Continuous>  dextrose 50% Injectable 12.5 Gram(s) IV Push once  dextrose 50% Injectable 25 Gram(s) IV Push once  dextrose 50% Injectable 25 Gram(s) IV Push once  enoxaparin Injectable 40 milliGRAM(s) SubCutaneous daily  ergocalciferol 05627 Unit(s) Oral every week  escitalopram 20 milliGRAM(s) Oral daily  fenofibrate Tablet 145 milliGRAM(s) Oral daily  insulin lispro (HumaLOG) corrective regimen sliding scale   SubCutaneous three times a day before meals  insulin lispro (HumaLOG) corrective regimen sliding scale   SubCutaneous at bedtime  methimazole 5 milliGRAM(s) Oral daily  pantoprazole    Tablet 40 milliGRAM(s) Oral before breakfast  pregabalin 200 milliGRAM(s) Oral two times a day  sodium chloride 0.9%. 1000 milliLiter(s) (100 mL/Hr) IV Continuous <Continuous>  traZODone 50 milliGRAM(s) Oral at bedtime    MEDICATIONS  (PRN):  dextrose 40% Gel 15 Gram(s) Oral once PRN Blood Glucose LESS THAN 70 milliGRAM(s)/deciliter  glucagon  Injectable 1 milliGRAM(s) IntraMuscular once PRN Glucose LESS THAN 70 milligrams/deciliter    LABS:                        11.3   5.08  )-----------( 224      ( 2020 08:29 )             36.2         140  |  103  |  13  ----------------------------<  185<H>  4.0   |  29  |  0.46<L>    Ca    9.0      2020 06:36    TPro  6.7  /  Alb  4.0  /  TBili  0.2  /  DBili  x   /  AST  25  /  ALT  36  /  AlkPhos  58        Urinalysis Basic - ( 2020 19:28 )    Color: Light Yellow / Appearance: Clear / S.022 / pH: x  Gluc: x / Ketone: Negative  / Bili: Negative / Urobili: 2 mg/dL   Blood: x / Protein: Negative / Nitrite: Negative   Leuk Esterase: Negative / RBC: x / WBC x   Sq Epi: x / Non Sq Epi: x / Bacteria: x      CAPILLARY BLOOD GLUCOSE      POCT Blood Glucose.: 260 mg/dL (2020 17:49)  POCT Blood Glucose.: 174 mg/dL (2020 10:01)  POCT Blood Glucose.: 265 mg/dL (2020 21:37)        Urinalysis Basic - ( 2020 19:28 )    Color: Light Yellow / Appearance: Clear / S.022 / pH: x  Gluc: x / Ketone: Negative  / Bili: Negative / Urobili: 2 mg/dL   Blood: x / Protein: Negative / Nitrite: Negative   Leuk Esterase: Negative / RBC: x / WBC x   Sq Epi: x / Non Sq Epi: x / Bacteria: x      REVIEW OF SYSTEMS:  CONSTITUTIONAL: No fever, weight loss, or fatigue  EYES: No eye pain, visual disturbances, or discharge  ENMT:  No difficulty hearing, tinnitus, vertigo; No sinus or throat pain  NECK: No pain or stiffness  RESPIRATORY: No cough, wheezing, chills or hemoptysis; No shortness of breath  CARDIOVASCULAR: No chest pain, palpitations, dizziness, or leg swelling  GASTROINTESTINAL: No abdominal or epigastric pain. No nausea, vomiting, or hematemesis; No diarrhea or constipation. No melena or hematochezia.  GENITOURINARY: No dysuria, frequency, hematuria, or incontinence  NEUROLOGICAL: No headaches, memory loss, loss of strength, numbness, or tremors      Consultant(s) Notes Reviewed:  [x ] YES  [ ] NO    PHYSICAL EXAM:  GENERAL: NAD, well-groomed, well-developed, not in any distress ,  HEAD:  Atraumatic, Normocephalic  EYES: EOMI, PERRLA, conjunctiva and sclera clear  ENMT: No tonsillar erythema, exudates, or enlargement; Moist mucous membranes, Good dentition, No lesions  NECK: Supple, No JVD, Normal thyroid  NERVOUS SYSTEM:  Alert & Oriented X3, No focal deficit   CHEST/LUNG: Good air entry bilateral with no  rales, rhonchi, wheezing, or rubs  HEART: Regular rate and rhythm; No murmurs, rubs, or gallops  ABDOMEN: Soft, Nontender, Nondistended; Bowel sounds present  EXTREMITIES:  2+ Peripheral Pulses, No clubbing, cyanosis, or edema    Care Discussed with Consultants/Other Providers [ x] YES  [ ] NO

## 2020-01-22 NOTE — DISCHARGE NOTE PROVIDER - HOSPITAL COURSE
55 y/o Female with PMHx HTN, HLD, Type 2 DM, Multiple Sclerosis, fibromyalgia, hyperthyroidism, hx CVA on ASA, multiple herniated discs, gastritis, hx hernia repairs presented with left sided chest pressure admitted for further evaluation. During hospital course, pt underwent a cardiac w/u: MI ruled out with negative troponin x 2, EKG with no acute ischemic changes, TTE with no segmental wall abnormalities, normal LV function, Pharm Stress test negative. GI w/u unrevealing, CT abd/pel with hepatomegaly & steatosis, RUQ US with Trace pericholecystic fluid without additional sonographic findings to support acute cholecystitis. Patient with no clinical signs of GB disease, tolerating PO well, t-bili & LFTs normal. Pt deemed medically stable for discharge home with plan to f/u with PCP & Cards in one week.

## 2020-01-22 NOTE — DISCHARGE NOTE NURSING/CASE MANAGEMENT/SOCIAL WORK - PATIENT PORTAL LINK FT
You can access the FollowMyHealth Patient Portal offered by [facility] by registering at the following website: [email or web address]. By joining [employer]’s FollowMyHealth portal, you will also be able to view your health information using other applications (apps) compatible with our system.

## 2020-01-22 NOTE — DISCHARGE NOTE PROVIDER - CARE PROVIDER_API CALL
Larry Bangura (DO)  Family Medicine  [address] [place] [address]  Phone: [phone]  Fax: [phone]  Established Patient  Follow Up Time: 1 week Larry Bangura (DO)  Family Medicine  [address] [place] [address]  Phone: [phone]  Fax: [phone]  Established Patient  Follow Up Time: 1 week    Shalini Mortensen)  Cardiovascular Disease; Internal Medicine; Interventional Cardiology  [address] [place] [address]  Phone: [phone]  Fax: [phone]  Established Patient  Follow Up Time: 1 week

## 2020-01-22 NOTE — DISCHARGE NOTE PROVIDER - NSDCCPCAREPLAN_GEN_ALL_CORE_FT
PRINCIPAL DISCHARGE DIAGNOSIS  Diagnosis: Atypical chest pain  Assessment and Plan of Treatment: Cardiac work-up unrevealing, ckeared by Cardiology  HOME CARE INSTRUCTIONS  For the next few days, avoid physical activities that bring on chest pain. Continue physical activities as directed.  Do not smoke.  Avoid drinking alcohol.   Only take over-the-counter or prescription medicine for pain, discomfort, or fever as directed by your caregiver.  Follow your caregiver's suggestions for further testing if your chest pain does not go away.  Keep any follow-up appointments you made. If you do not go to an appointment, you could develop lasting (chronic) problems with pain. If there is any problem keeping an appointment, you must call to reschedule.   SEEK MEDICAL CARE IF:  You think you are having problems from the medicine you are taking. Read your medicine instructions carefully.  Your chest pain does not go away, even after treatment.  You develop a rash with blisters on your chest.  SEEK IMMEDIATE MEDICAL CARE IF:  You have increased chest pain or pain that spreads to your arm, neck, jaw, back, or abdomen.   You develop shortness of breath, an increasing cough, or you are coughing up blood.  You have severe back or abdominal pain, feel nauseous, or vomit.  You develop severe weakness, fainting, or chills.  You have a fever.  THIS IS AN EMERGENCY. Do not wait to see if the pain will go away. Get medical help at once. Call your local emergency services 911. Do not drive yourself to the hospital.      SECONDARY DISCHARGE DIAGNOSES  Diagnosis: Hyperthyroidism  Assessment and Plan of Treatment: Continue with current medication regimen  Follow-up with your PCP for ongoing management as advised    Diagnosis: Diabetes  Assessment and Plan of Treatment: Make sure you get your HgA1c checked every three months.  If you take oral diabetes medications, check your blood glucose two times a day.  If you take insulin, check your blood glucose before meals and at bedtime.  It's important not to skip any meals.  Keep a log of your blood glucose results and always take it with you to your doctor appointments.  Keep a list of your current medications including injectables and over the counter medications and bring this medication list with you to all your doctor appointments.  If you have not seen your ophthalmologist this year call for appointment.  Check your feet daily for redness, sores, or openings. Do not self treat. If no improvement in two days call your primary care physician for an appointment.  Low blood sugar (hypoglycemia) is a blood sugar below 70mg/dl. Check your blood sugar if you feel signs/symptoms of hypoglycemia. If your blood sugar is below 70 take 15 grams of carbohydrates (ex 4 oz of apple juice, 3-4 glucose tablets, or 4-6 oz of regular soda) wait 15 minutes and repeat blood sugar to make sure it comes up above 70.  If your blood sugar is above 70 and you are due for a meal, have a meal.  If you are not due for a meal have a snack.  This snack helps keeps your blood sugar at a safe range.    Diagnosis: Neuropathy due to type 2 diabetes mellitus  Assessment and Plan of Treatment: Continue to keep your glucose levels under conrol  Continue your current medication regimen as advised  Follow-up with your PCP for ongoing diabetes care

## 2020-02-05 ENCOUNTER — APPOINTMENT (OUTPATIENT)
Dept: ENDOCRINOLOGY | Facility: CLINIC | Age: 55
End: 2020-02-05
Payer: MEDICARE

## 2020-02-05 PROCEDURE — 97802 MEDICAL NUTRITION INDIV IN: CPT

## 2020-05-04 ENCOUNTER — APPOINTMENT (OUTPATIENT)
Dept: ENDOCRINOLOGY | Facility: CLINIC | Age: 55
End: 2020-05-04
Payer: MEDICARE

## 2020-05-04 PROCEDURE — G0108 DIAB MANAGE TRN  PER INDIV: CPT

## 2020-05-04 PROCEDURE — 98967 PH1 ASSMT&MGMT NQHP 11-20: CPT

## 2020-07-08 NOTE — CONSULT NOTE ADULT - PROBLEM SELECTOR RECOMMENDATION 9
Received call from patient  States that she takes mirapex 1 tab daily, not half tab  Requesting new script to be sent to pharmacy  Please advise 
Will continue current insulin regimen for now. Will continue monitoring FS, log, will Follow up.  Will increase Lantus to 12 units at bed time.  Will increase Humalog to 7 units before each meal in addition to Humalog correction scale coverage.  Patient counseled for compliance with consistent low carb diet.

## 2021-05-04 ENCOUNTER — EMERGENCY (EMERGENCY)
Facility: HOSPITAL | Age: 56
LOS: 1 days | Discharge: HOME | End: 2021-05-04
Attending: EMERGENCY MEDICINE
Payer: MEDICARE

## 2021-05-04 VITALS
HEIGHT: 60 IN | OXYGEN SATURATION: 97 % | SYSTOLIC BLOOD PRESSURE: 126 MMHG | TEMPERATURE: 97 F | WEIGHT: 199.96 LBS | HEART RATE: 102 BPM | RESPIRATION RATE: 18 BRPM | DIASTOLIC BLOOD PRESSURE: 83 MMHG

## 2021-05-04 DIAGNOSIS — Z98.89 OTHER SPECIFIED POSTPROCEDURAL STATES: Chronic | ICD-10-CM

## 2021-05-04 DIAGNOSIS — K46.9 UNSPECIFIED ABDOMINAL HERNIA WITHOUT OBSTRUCTION OR GANGRENE: Chronic | ICD-10-CM

## 2021-05-04 PROCEDURE — 93971 EXTREMITY STUDY: CPT | Mod: 26,LT

## 2021-05-04 PROCEDURE — 99285 EMERGENCY DEPT VISIT HI MDM: CPT

## 2021-05-04 RX ORDER — IBUPROFEN 200 MG
600 TABLET ORAL ONCE
Refills: 0 | Status: COMPLETED | OUTPATIENT
Start: 2021-05-04 | End: 2021-05-04

## 2021-05-04 RX ORDER — ACETAMINOPHEN 500 MG
975 TABLET ORAL ONCE
Refills: 0 | Status: COMPLETED | OUTPATIENT
Start: 2021-05-04 | End: 2021-05-04

## 2021-05-04 RX ADMIN — Medication 600 MILLIGRAM(S): at 23:00

## 2021-05-04 RX ADMIN — Medication 975 MILLIGRAM(S): at 22:25

## 2021-05-04 RX ADMIN — Medication 975 MILLIGRAM(S): at 23:00

## 2021-05-04 RX ADMIN — Medication 600 MILLIGRAM(S): at 22:25

## 2021-05-04 NOTE — ED PROVIDER NOTE - CLINICAL SUMMARY MEDICAL DECISION MAKING FREE TEXT BOX
54 y/o female with hx of MS, DM, hyperthyroidism, CVA presenting to the ED for left thigh pain. 56 y/o female with hx of MS, DM, hyperthyroidism, CVA presenting to the ED for left thigh pain. Patient mildly tachycardic and hypothermic on exam. Concern for DVT vs possibly infectious etiology i.e osteomyelitis. Plan for labs, XR, U/S of LLE. Will reassess following labs and imaging. Jose C Heath, DO PGY2 56 y/o female with hx of MS, DM, hyperthyroidism, CVA presenting to the ED for left thigh pain. Patient mildly tachycardic and hypothermic on exam. Concern for DVT vs possibly infectious etiology i.e osteomyelitis. Plan for labs, XR, U/S of LLE. Will reassess following labs and imaging.  ZR

## 2021-05-04 NOTE — ED PROVIDER NOTE - DISCHARGE DATE
Patient's mother had left msg on voicemail in [facility] questioning time of arrival for today's surgery for patient. Mother called when writer arrived to dept.  Patient's family has to drop other children off at school  and then will come directly to [facility] for pts surgery.  [facility] notified of this .   
05-May-2021

## 2021-05-04 NOTE — ED PROVIDER NOTE - PATIENT PORTAL LINK FT
You can access the FollowMyHealth Patient Portal offered by [facility] by registering at the following website: [email or web address]. By joining [employer]’s FollowMyHealth portal, you will also be able to view your health information using other applications (apps) compatible with our system.

## 2021-05-04 NOTE — ED ADULT NURSE NOTE - OBJECTIVE STATEMENT
55y old female PMH HTN, Stroke, Neuropathy, DM walk in from triage c/o left thigh pain. A&Ox3. Patient states past 4 days has been having left thigh pain w/ numbness and tingling intermittent, unable to ambulate without pain, worse with positional changes. Pt states is "feels like burning" no reddness or warmth noted at the site. She denies blurred vision, weakness, chest pain, sob, ha, n/v/d, abdominal pain, f/c, urinary symptoms, hematuria. Patient appears uncomfortable,  gross neuro intact, lungs cta bilaterally, no difficulty speaking in complete sentences, abdomen soft nontender nondistended, skin intact, no swelling, redness or warmth present on left thigh.

## 2021-05-04 NOTE — ED PROVIDER NOTE - PROGRESS NOTE DETAILS
Patient reports pain is now improved after valium and oxycodone. Able to ambulate in the ED. Plan to discharge home for f/u with PMD. Jose C Heath,  PGY2

## 2021-05-04 NOTE — ED PROVIDER NOTE - OBJECTIVE STATEMENT
56 y/o female with hx of MS, DM, hyperthyroidism, CVA presenting to the ED for left thigh pain. Reports worsening pain over the lateral aspect of her left thigh over the past week. States she is now unable to sit down secondary to the pain. It is described as a throbbing sensation and occasionally has some tingling. Denies any fever, chills, nausea or vomiting. No trauma.

## 2021-05-04 NOTE — ED PROVIDER NOTE - NSFOLLOWUPINSTRUCTIONS_ED_ALL_ED_FT
You were seen in the ED for leg pain.    Your xray and ultrasound did not show any abnormalities.    Use valium only for severe symptoms. Take tylenol and motrin every 6 hours as needed.    Return for worsening pain or any other symptoms.

## 2021-05-04 NOTE — ED PROVIDER NOTE - PHYSICAL EXAMINATION
GENERAL: Awake, alert, NAD  HEENT: NC/AT, moist mucous membranes, PERRL, EOMI  LUNGS: CTAB, no wheezes or crackles   CARDIAC: RRR, no m/r/g  ABDOMEN: Soft, normal BS, non tender, non distended, no rebound, no guarding  BACK: No midline spinal tenderness, no CVA tenderness  EXT: +mild tenderness to palpation of lateral aspect of left thigh, pain on extension of LLE, 2+ DP pulses bilaterally, +genu valgum  NEURO: A&Ox3. Moving all extremities.  SKIN: Warm and dry. No rash.  PSYCH: Normal affect. GENERAL: Awake, alert, NAD  HEENT: NC/AT, moist mucous membranes  LUNGS: CTAB, no wheezes or crackles   CARDIAC: RRR, no m/r/g  ABDOMEN: Soft, normal BS, non tender, non distended, no rebound, no guarding  BACK: No midline spinal tenderness, no CVA tenderness  EXT: +mild tenderness to palpation of lateral aspect of left thigh, pain on extension of LLE, 2+ DP pulses bilaterally, +genu valgum  NEURO: A&Ox3. Moving all extremities.  SKIN: Warm and dry. No rash.  PSYCH: Normal affect.

## 2021-05-05 VITALS
DIASTOLIC BLOOD PRESSURE: 70 MMHG | SYSTOLIC BLOOD PRESSURE: 108 MMHG | RESPIRATION RATE: 17 BRPM | HEART RATE: 78 BPM | TEMPERATURE: 98 F | OXYGEN SATURATION: 98 %

## 2021-05-05 LAB
ALBUMIN SERPL ELPH-MCNC: 4.2 G/DL — SIGNIFICANT CHANGE UP (ref 3.3–5)
ALP SERPL-CCNC: 88 U/L — SIGNIFICANT CHANGE UP (ref 40–120)
ALT FLD-CCNC: 59 U/L — HIGH (ref 10–45)
ANION GAP SERPL CALC-SCNC: 17 MMOL/L — SIGNIFICANT CHANGE UP (ref 5–17)
AST SERPL-CCNC: 54 U/L — HIGH (ref 10–40)
BASE EXCESS BLDV CALC-SCNC: 0.1 MMOL/L — SIGNIFICANT CHANGE UP (ref -2–2)
BASE EXCESS BLDV CALC-SCNC: 0.1 MMOL/L — SIGNIFICANT CHANGE UP (ref -2–2)
BASE EXCESS BLDV CALC-SCNC: 1.7 MMOL/L — SIGNIFICANT CHANGE UP (ref -2–2)
BASOPHILS # BLD AUTO: 0.04 K/UL — SIGNIFICANT CHANGE UP (ref 0–0.2)
BASOPHILS NFR BLD AUTO: 0.7 % — SIGNIFICANT CHANGE UP (ref 0–2)
BILIRUB SERPL-MCNC: 0.2 MG/DL — SIGNIFICANT CHANGE UP (ref 0.2–1.2)
BUN SERPL-MCNC: 10 MG/DL — SIGNIFICANT CHANGE UP (ref 7–23)
CA-I SERPL-SCNC: 1.09 MMOL/L — LOW (ref 1.12–1.3)
CA-I SERPL-SCNC: 1.13 MMOL/L — SIGNIFICANT CHANGE UP (ref 1.12–1.3)
CA-I SERPL-SCNC: 1.14 MMOL/L — SIGNIFICANT CHANGE UP (ref 1.12–1.3)
CALCIUM SERPL-MCNC: 9.3 MG/DL — SIGNIFICANT CHANGE UP (ref 8.4–10.5)
CHLORIDE BLDV-SCNC: 111 MMOL/L — HIGH (ref 96–108)
CHLORIDE BLDV-SCNC: 111 MMOL/L — HIGH (ref 96–108)
CHLORIDE BLDV-SCNC: 112 MMOL/L — HIGH (ref 96–108)
CHLORIDE SERPL-SCNC: 106 MMOL/L — SIGNIFICANT CHANGE UP (ref 96–108)
CO2 BLDV-SCNC: 27 MMOL/L — SIGNIFICANT CHANGE UP (ref 22–30)
CO2 BLDV-SCNC: 28 MMOL/L — SIGNIFICANT CHANGE UP (ref 22–30)
CO2 BLDV-SCNC: 29 MMOL/L — SIGNIFICANT CHANGE UP (ref 22–30)
CO2 SERPL-SCNC: 18 MMOL/L — LOW (ref 22–31)
CREAT SERPL-MCNC: 0.43 MG/DL — LOW (ref 0.5–1.3)
CRP SERPL-MCNC: 3 MG/L — SIGNIFICANT CHANGE UP (ref 0–4)
EOSINOPHIL # BLD AUTO: 0.26 K/UL — SIGNIFICANT CHANGE UP (ref 0–0.5)
EOSINOPHIL NFR BLD AUTO: 4.3 % — SIGNIFICANT CHANGE UP (ref 0–6)
ERYTHROCYTE [SEDIMENTATION RATE] IN BLOOD: 23 MM/HR — HIGH (ref 0–20)
GAS PNL BLDV: 138 MMOL/L — SIGNIFICANT CHANGE UP (ref 135–145)
GAS PNL BLDV: SIGNIFICANT CHANGE UP
GLUCOSE BLDV-MCNC: 180 MG/DL — HIGH (ref 70–99)
GLUCOSE BLDV-MCNC: 187 MG/DL — HIGH (ref 70–99)
GLUCOSE BLDV-MCNC: 191 MG/DL — HIGH (ref 70–99)
GLUCOSE SERPL-MCNC: 187 MG/DL — HIGH (ref 70–99)
HCO3 BLDV-SCNC: 25 MMOL/L — SIGNIFICANT CHANGE UP (ref 21–29)
HCO3 BLDV-SCNC: 26 MMOL/L — SIGNIFICANT CHANGE UP (ref 21–29)
HCO3 BLDV-SCNC: 27 MMOL/L — SIGNIFICANT CHANGE UP (ref 21–29)
HCT VFR BLD CALC: 38 % — SIGNIFICANT CHANGE UP (ref 34.5–45)
HCT VFR BLDA CALC: 34 % — LOW (ref 39–50)
HCT VFR BLDA CALC: 38 % — LOW (ref 39–50)
HCT VFR BLDA CALC: 40 % — SIGNIFICANT CHANGE UP (ref 39–50)
HGB BLD CALC-MCNC: 11.2 G/DL — LOW (ref 11.5–15.5)
HGB BLD CALC-MCNC: 12.5 G/DL — SIGNIFICANT CHANGE UP (ref 11.5–15.5)
HGB BLD CALC-MCNC: 13 G/DL — SIGNIFICANT CHANGE UP (ref 11.5–15.5)
HGB BLD-MCNC: 12.5 G/DL — SIGNIFICANT CHANGE UP (ref 11.5–15.5)
IMM GRANULOCYTES NFR BLD AUTO: 0.3 % — SIGNIFICANT CHANGE UP (ref 0–1.5)
LACTATE BLDV-MCNC: 2.3 MMOL/L — HIGH (ref 0.7–2)
LACTATE BLDV-MCNC: 3.1 MMOL/L — HIGH (ref 0.7–2)
LACTATE BLDV-MCNC: 3.2 MMOL/L — HIGH (ref 0.7–2)
LYMPHOCYTES # BLD AUTO: 2.01 K/UL — SIGNIFICANT CHANGE UP (ref 1–3.3)
LYMPHOCYTES # BLD AUTO: 33.3 % — SIGNIFICANT CHANGE UP (ref 13–44)
MCHC RBC-ENTMCNC: 28.6 PG — SIGNIFICANT CHANGE UP (ref 27–34)
MCHC RBC-ENTMCNC: 32.9 GM/DL — SIGNIFICANT CHANGE UP (ref 32–36)
MCV RBC AUTO: 87 FL — SIGNIFICANT CHANGE UP (ref 80–100)
MONOCYTES # BLD AUTO: 0.74 K/UL — SIGNIFICANT CHANGE UP (ref 0–0.9)
MONOCYTES NFR BLD AUTO: 12.3 % — SIGNIFICANT CHANGE UP (ref 2–14)
NEUTROPHILS # BLD AUTO: 2.97 K/UL — SIGNIFICANT CHANGE UP (ref 1.8–7.4)
NEUTROPHILS NFR BLD AUTO: 49.1 % — SIGNIFICANT CHANGE UP (ref 43–77)
NRBC # BLD: 0 /100 WBCS — SIGNIFICANT CHANGE UP (ref 0–0)
PCO2 BLDV: 46 MMHG — SIGNIFICANT CHANGE UP (ref 35–50)
PCO2 BLDV: 50 MMHG — SIGNIFICANT CHANGE UP (ref 35–50)
PCO2 BLDV: 54 MMHG — HIGH (ref 35–50)
PH BLDV: 7.31 — LOW (ref 7.35–7.45)
PH BLDV: 7.36 — SIGNIFICANT CHANGE UP (ref 7.35–7.45)
PH BLDV: 7.36 — SIGNIFICANT CHANGE UP (ref 7.35–7.45)
PLATELET # BLD AUTO: 275 K/UL — SIGNIFICANT CHANGE UP (ref 150–400)
PO2 BLDV: 22 MMHG — LOW (ref 25–45)
PO2 BLDV: 35 MMHG — SIGNIFICANT CHANGE UP (ref 25–45)
PO2 BLDV: 47 MMHG — HIGH (ref 25–45)
POTASSIUM BLDV-SCNC: 3.4 MMOL/L — LOW (ref 3.5–5.3)
POTASSIUM BLDV-SCNC: 3.6 MMOL/L — SIGNIFICANT CHANGE UP (ref 3.5–5.3)
POTASSIUM BLDV-SCNC: 3.8 MMOL/L — SIGNIFICANT CHANGE UP (ref 3.5–5.3)
POTASSIUM SERPL-MCNC: 4 MMOL/L — SIGNIFICANT CHANGE UP (ref 3.5–5.3)
POTASSIUM SERPL-SCNC: 4 MMOL/L — SIGNIFICANT CHANGE UP (ref 3.5–5.3)
PROT SERPL-MCNC: 7.4 G/DL — SIGNIFICANT CHANGE UP (ref 6–8.3)
RBC # BLD: 4.37 M/UL — SIGNIFICANT CHANGE UP (ref 3.8–5.2)
RBC # FLD: 14.6 % — HIGH (ref 10.3–14.5)
SAO2 % BLDV: 28 % — LOW (ref 67–88)
SAO2 % BLDV: 63 % — LOW (ref 67–88)
SAO2 % BLDV: 78 % — SIGNIFICANT CHANGE UP (ref 67–88)
SODIUM SERPL-SCNC: 141 MMOL/L — SIGNIFICANT CHANGE UP (ref 135–145)
WBC # BLD: 6.04 K/UL — SIGNIFICANT CHANGE UP (ref 3.8–10.5)
WBC # FLD AUTO: 6.04 K/UL — SIGNIFICANT CHANGE UP (ref 3.8–10.5)

## 2021-05-05 PROCEDURE — 83605 ASSAY OF LACTIC ACID: CPT

## 2021-05-05 PROCEDURE — 80053 COMPREHEN METABOLIC PANEL: CPT

## 2021-05-05 PROCEDURE — 82947 ASSAY GLUCOSE BLOOD QUANT: CPT

## 2021-05-05 PROCEDURE — 82565 ASSAY OF CREATININE: CPT

## 2021-05-05 PROCEDURE — 73552 X-RAY EXAM OF FEMUR 2/>: CPT

## 2021-05-05 PROCEDURE — 84132 ASSAY OF SERUM POTASSIUM: CPT

## 2021-05-05 PROCEDURE — 85025 COMPLETE CBC W/AUTO DIFF WBC: CPT

## 2021-05-05 PROCEDURE — 85018 HEMOGLOBIN: CPT

## 2021-05-05 PROCEDURE — 84295 ASSAY OF SERUM SODIUM: CPT

## 2021-05-05 PROCEDURE — 85652 RBC SED RATE AUTOMATED: CPT

## 2021-05-05 PROCEDURE — 82803 BLOOD GASES ANY COMBINATION: CPT

## 2021-05-05 PROCEDURE — 73552 X-RAY EXAM OF FEMUR 2/>: CPT | Mod: 26,LT

## 2021-05-05 PROCEDURE — 86140 C-REACTIVE PROTEIN: CPT

## 2021-05-05 PROCEDURE — 85014 HEMATOCRIT: CPT

## 2021-05-05 PROCEDURE — 93971 EXTREMITY STUDY: CPT

## 2021-05-05 PROCEDURE — 82330 ASSAY OF CALCIUM: CPT

## 2021-05-05 PROCEDURE — 99284 EMERGENCY DEPT VISIT MOD MDM: CPT | Mod: 25

## 2021-05-05 PROCEDURE — 82435 ASSAY OF BLOOD CHLORIDE: CPT

## 2021-05-05 RX ORDER — OXYCODONE HYDROCHLORIDE 5 MG/1
5 TABLET ORAL ONCE
Refills: 0 | Status: DISCONTINUED | OUTPATIENT
Start: 2021-05-05 | End: 2021-05-05

## 2021-05-05 RX ORDER — DIAZEPAM 5 MG
5 TABLET ORAL ONCE
Refills: 0 | Status: DISCONTINUED | OUTPATIENT
Start: 2021-05-05 | End: 2021-05-05

## 2021-05-05 RX ORDER — DIAZEPAM 5 MG
1 TABLET ORAL
Qty: 8 | Refills: 0
Start: 2021-05-05 | End: 2021-05-06

## 2021-05-05 RX ORDER — SODIUM CHLORIDE 9 MG/ML
500 INJECTION INTRAMUSCULAR; INTRAVENOUS; SUBCUTANEOUS ONCE
Refills: 0 | Status: COMPLETED | OUTPATIENT
Start: 2021-05-05 | End: 2021-05-05

## 2021-05-05 RX ADMIN — SODIUM CHLORIDE 500 MILLILITER(S): 9 INJECTION INTRAMUSCULAR; INTRAVENOUS; SUBCUTANEOUS at 02:19

## 2021-05-05 RX ADMIN — Medication 5 MILLIGRAM(S): at 02:29

## 2021-05-05 RX ADMIN — OXYCODONE HYDROCHLORIDE 5 MILLIGRAM(S): 5 TABLET ORAL at 03:36

## 2021-05-05 RX ADMIN — OXYCODONE HYDROCHLORIDE 5 MILLIGRAM(S): 5 TABLET ORAL at 04:33

## 2021-05-05 RX ADMIN — Medication 5 MILLIGRAM(S): at 03:36

## 2021-05-05 NOTE — ED ADULT NURSE REASSESSMENT NOTE - NS ED NURSE REASSESS COMMENT FT1
Patient crying states "my pain came back and its throbbing" no relief. MD notified, at bedside evaluating patients. Pending new orders.

## 2021-05-05 NOTE — ED ADULT NURSE REASSESSMENT NOTE - NS ED NURSE REASSESS COMMENT FT1
Pt resting comfortably with states her pain is better from last medication administration. Pending d/c instructions

## 2022-04-05 NOTE — ED ADULT NURSE REASSESSMENT NOTE - ED CARDIAC RHYTHM
POST-OP EVALUATION  4/5/2022    Subjective   Emilee Neil is a 21 y o  female is here today for routine post-operative follow up  She has had no problems since her surgery  Past Medical History:   Diagnosis Date    Anxiety     Asthma      Past Surgical History:   Procedure Laterality Date    COSMETIC SURGERY      INCISION AND DRAINAGE OF WOUND      head/face/eye    ME EXC SKIN BENIG <0 5 CM FACE,FACIAL N/A 3/30/2022    Procedure: EXCISION OF MULTIPLE FOREIGN BODIES FROM FACE, BENIGN LESION EXCISION WITH E;  Surgeon: Jordan Medeiros MD;  Location: [facility];  Service: Plastics        Current Outpatient Medications:     buPROPion (WELLBUTRIN) 100 mg tablet, Take 100 mg by mouth in the morning  , Disp: , Rfl:     propranolol (INDERAL) 60 mg tablet, Take 60 mg by mouth 2 (two) times a day  , Disp: , Rfl:   Allergies: Patient has no known allergies  Objective    Incisions intact  Sutures removed  Assessment/Plan   Diagnoses and all orders for this visit:    Foreign body (FB) in soft tissue    Pt given wound care information:  Post Op Scar Care Instructions:  -Massage silicone scar gel (ex, Biocorneum, [employer] Silicone Scar Gel, Scar Away) into incision twice daily for 3 months  Or   -Apply silicone scar patch (ex, Oleeva) to incision for up to 23 hours per day for 3 months    -Wear SPF 30+ on incision at all times with any sun exposure  Call with any questions or concerns          Sherice Celestin PA-C
regular

## 2022-10-21 NOTE — ED ADULT TRIAGE NOTE - RESPIRATORY RATE (BREATHS/MIN)
61F hx of morbid obesity, seizure disorder, osteoarthritis, asthma, hypothyroidism, depression presents to ED after mechanical fall at [facility]. Pt was getting out of bed to go to the bathroom and her left knee buckled. She fell on her L knee and L wrist. Denies LOC, head injury, right sided pain, palpitations, dizziness, vertigo, presyncope, syncope, chest pain, cough, dyspnea, abdominal pain, n/v, diarrhea, dysuria, hematuria.    No anticoag or antiplt agents. 114 18 No indicators present

## 2022-12-05 NOTE — PROGRESS NOTE ADULT - PROBLEM SELECTOR PLAN 2
Detail Level: Zone
CTA chest pending...IVF as got contrast yesterday also
Cath negative and CTA no pE .
Continue Tapazole, repeat TFTs in 4 weeks.
Sugars still high ..endo helping

## 2023-03-17 NOTE — ED ADULT TRIAGE NOTE - HEIGHT IN CM
147.32 Olumiant Counseling: I discussed with the patient the risks of Olumiant therapy including but not limited to upper respiratory tract infections, shingles, cold sores, and nausea. Live vaccines should be avoided.  This medication has been linked to serious infections; higher rate of mortality; malignancy and lymphoproliferative disorders; major adverse cardiovascular events; thrombosis; gastrointestinal perforations; neutropenia; lymphopenia; anemia; liver enzyme elevations; and lipid elevations.

## 2023-11-20 NOTE — PATIENT PROFILE ADULT. - SOCIAL CONCERNS
Maris was here for a follow-up visit she presented as well put together and was cooperative and collaborative throughout the visit.    Maris reported recent increase in OCD worries about getting sick and compulsions.     Created fear latter and planned exposures.    Follow-up in 1 week.  
None

## 2023-11-30 NOTE — PROGRESS NOTE ADULT - PROBLEM SELECTOR PLAN 3
FU with cardiology. normal/ROM intact/normal gait/strength 5/5 bilateral upper extremities/strength 5/5 bilateral lower extremities details…

## 2024-04-04 NOTE — PATIENT PROFILE ADULT - HAVE YOU EXPERIENCED VIOLENCE OR A TRAUMATIC EVENT?
EICU BRIEF ADMIT NOTE:    Reason for ICU admission:  NSTEMI    Please refer to admission H&P for details.     Comfortably sleeping in the bed.  Not in distress.    Chart reviewed: yes  Recent MD notes reviewed: Yes  Labs results reviewed: yes  Radiology: Chest images reviewed. Reports for others reviewed.  Telemetry tracing: yes  Evaluation via interactive audio and video telecommunications: yes comfortable and not in distress.  Communicated issues/orders/plan with: bedside ICU RN    Best Practices Review:  Stress ulcer prophylaxis: not indicated  DVT prophylaxis: Pharmacological .         Ventilator review:  Intubated : No      Assessment & Plan:     Impression:  NSTEMI  CAD with in stent thrombosis in RCA  Hypotension  Hypokalemia      Plan:  Cardiology follow-up.  Echo in the a.m..  Continue aspirin and Plavix.  Continue statin and metoprolol.  Replace electrolyte.  Continue rest of supportive care.      Thank you for allowing the EICU to participate in your patient's care. Please feel free to call us as needed.     I have encourage bedside RN to call me with the results of labs/imaging if ordered.         Jazz Browning MD  [facility]  [phone]    no